# Patient Record
Sex: FEMALE | Race: WHITE | Employment: UNEMPLOYED | URBAN - NONMETROPOLITAN AREA
[De-identification: names, ages, dates, MRNs, and addresses within clinical notes are randomized per-mention and may not be internally consistent; named-entity substitution may affect disease eponyms.]

---

## 2020-12-16 ENCOUNTER — HOSPITAL ENCOUNTER (EMERGENCY)
Age: 40
Discharge: HOME | End: 2020-12-16
Attending: EMERGENCY MEDICINE
Payer: COMMERCIAL

## 2020-12-16 VITALS
OXYGEN SATURATION: 100 % | SYSTOLIC BLOOD PRESSURE: 124 MMHG | TEMPERATURE: 98.5 F | WEIGHT: 170 LBS | RESPIRATION RATE: 16 BRPM | DIASTOLIC BLOOD PRESSURE: 86 MMHG | HEART RATE: 73 BPM

## 2020-12-16 DIAGNOSIS — R10.9 ABDOMINAL CRAMPING: Primary | ICD-10-CM

## 2020-12-16 DIAGNOSIS — D25.9 UTERINE LEIOMYOMA, UNSPECIFIED LOCATION: ICD-10-CM

## 2020-12-16 LAB
APPEARANCE UR: CLEAR
BACTERIA URNS QL MICRO: NEGATIVE /HPF
BILIRUB UR QL: NEGATIVE
COLOR UR: YELLOW
GLUCOSE UR STRIP.AUTO-MCNC: NEGATIVE MG/DL
HCG UR QL: NEGATIVE
HGB UR QL STRIP: ABNORMAL
KETONES UR QL STRIP.AUTO: NEGATIVE MG/DL
LEUKOCYTE ESTERASE UR QL STRIP.AUTO: NEGATIVE
NITRITE UR QL STRIP.AUTO: NEGATIVE
PH UR STRIP: 6.5 [PH] (ref 5–8)
PROT UR STRIP-MCNC: NEGATIVE MG/DL
RBC #/AREA URNS HPF: ABNORMAL /HPF (ref 0–3)
SP GR UR REFRACTOMETRY: 1 (ref 1–1.03)
UROBILINOGEN UR QL STRIP.AUTO: 0.2 EU/DL (ref 0.2–1)
WBC URNS QL MICRO: ABNORMAL /HPF (ref 0–5)

## 2020-12-16 PROCEDURE — 81025 URINE PREGNANCY TEST: CPT

## 2020-12-16 PROCEDURE — 81003 URINALYSIS AUTO W/O SCOPE: CPT

## 2020-12-16 PROCEDURE — 99282 EMERGENCY DEPT VISIT SF MDM: CPT

## 2020-12-16 RX ORDER — NORETHINDRONE ACETATE AND ETHINYL ESTRADIOL, AND FERROUS FUMARATE 1.5-30(21)
KIT ORAL
COMMUNITY
Start: 2020-12-08

## 2020-12-16 RX ORDER — RANITIDINE 150 MG/1
150 TABLET, FILM COATED ORAL 2 TIMES DAILY
COMMUNITY

## 2020-12-16 NOTE — ED NOTES
Pt and family member given discharge instructions. No further questions at this time. Pain management discussed.

## 2020-12-16 NOTE — ED PROVIDER NOTES
EMERGENCY DEPARTMENT HISTORY AND PHYSICAL EXAM      Date: 12/16/2020  Patient Name: Bethanie Roberts    History of Presenting Illness     Chief Complaint   Patient presents with    Abdominal Pain    Hypertension       History Provided By: Patient and Patient's Son    HPI: Bethanie Roberts, 35 y.o. female with a past medical history significant Uterine fibroids presents to the ED with cc of intermittent abdominal cramping worse with menstruation ongoing now for multiple months. Previously evaluated  by OB/GYN for uterine fibroids and begun on hormonal therapy which is offered minimal relief. No fever occasional stool and lower abdominal cramping denies urinary symptoms. Pregnancy with normal vaginal delivery about a year and a half ago. Concerned with blood pressure though BP is upper limits of normal here in the ED no prior diagnosis of hypertension. The patient speaks virtually no English and translation is through her son who is fluent in both her native language and [place]. There are no other complaints, changes, or physical findings at this time. PCP: None    No current facility-administered medications on file prior to encounter. Current Outpatient Medications on File Prior to Encounter   Medication Sig Dispense Refill    Junel FE 1.5/30, 28, 1.5 mg-30 mcg (21)/75 mg (7) tab TAKE 1 TABLET BY MOUTH EVERY DAY      raNITIdine (ZANTAC) 150 mg tablet Take 150 mg by mouth two (2) times a day. Past History     Past Medical History:  History reviewed. No pertinent past medical history. Past Surgical History:  History reviewed. No pertinent surgical history. Family History:  History reviewed. No pertinent family history.     Social History:  Social History     Tobacco Use    Smoking status: Never Smoker    Smokeless tobacco: Never Used   Substance Use Topics    Alcohol use: Never     Frequency: Never    Drug use: Not on file       Allergies:  No Known Allergies      Review of Systems Review of other systems is negative  Review of Systems    Physical Exam   Pleasant female in no acute distress  Physical Exam  Vitals signs and nursing note reviewed. Constitutional:       General: She is not in acute distress. Appearance: Normal appearance. She is not ill-appearing, toxic-appearing or diaphoretic. HENT:      Head: Normocephalic and atraumatic. Right Ear: Tympanic membrane normal.      Left Ear: Tympanic membrane normal.      Nose: Nose normal.      Mouth/Throat:      Mouth: Mucous membranes are moist.   Eyes:      Conjunctiva/sclera: Conjunctivae normal.   Neck:      Musculoskeletal: Normal range of motion and neck supple. No neck rigidity or muscular tenderness. Cardiovascular:      Rate and Rhythm: Normal rate and regular rhythm. Pulses: Normal pulses. Heart sounds: Normal heart sounds. No murmur. Pulmonary:      Effort: Pulmonary effort is normal. No respiratory distress. Breath sounds: Normal breath sounds. No wheezing, rhonchi or rales. Chest:      Chest wall: No tenderness. Abdominal:      General: Abdomen is flat. Bowel sounds are normal.      Palpations: Abdomen is soft. There is no mass. Tenderness: There is no abdominal tenderness. There is no right CVA tenderness, left CVA tenderness, guarding or rebound. Hernia: No hernia is present. Comments: Minimal discomfort with deep pelvic palpation no true abdominal tenderness   Musculoskeletal: Normal range of motion. General: No tenderness, deformity or signs of injury. Right lower leg: No edema. Left lower leg: No edema. Skin:     General: Skin is warm. Findings: No erythema or rash. Neurological:      General: No focal deficit present. Mental Status: She is alert and oriented to person, place, and time. Cranial Nerves: No cranial nerve deficit. Sensory: No sensory deficit. Motor: No weakness.    Psychiatric:         Mood and Affect: Mood normal. Behavior: Behavior normal.         Thought Content: Thought content normal.         Lab and Diagnostic Study Results     Labs -     Recent Results (from the past 12 hour(s))   URINALYSIS W/ RFLX MICROSCOPIC    Collection Time: 12/16/20 10:30 AM   Result Value Ref Range    Color Yellow      Appearance Clear Clear      Specific gravity 1.005 1.003 - 1.030      pH (UA) 6.5 5.0 - 8.0      Protein Negative Negative mg/dL    Glucose Negative Negative mg/dL    Ketone Negative Negative mg/dL    Bilirubin Negative Negative      Blood Moderate (A) Negative      Urobilinogen 0.2 0.2 - 1.0 EU/dL    Nitrites Negative Negative      Leukocyte Esterase Negative Negative      WBC 0-4 0 - 5 /hpf    RBC 0-5 0 - 3 /hpf    Bacteria Negative Negative /hpf   HCG URINE, QL    Collection Time: 12/16/20 10:30 AM   Result Value Ref Range    HCG urine, QL Negative Negative         Radiologic Studies -   @lastxrresult@  CT Results  (Last 48 hours)    None        CXR Results  (Last 48 hours)    None            Medical Decision Making   - I am the first provider for this patient. - I reviewed the vital signs, available nursing notes, past medical history, past surgical history, family history and social history. - Initial assessment performed. The patients presenting problems have been discussed, and they are in agreement with the care plan formulated and outlined with them. I have encouraged them to ask questions as they arise throughout their visit. Vital Signs-Reviewed the patient's vital signs.   Patient Vitals for the past 12 hrs:   Temp Pulse Resp BP SpO2   12/16/20 0957 98.5 °F (36.9 °C) 88 20 (!) 144/88 100 %       Records Reviewed: Nursing Notes and Old Medical Records    The patient presents with abdominal cramping with a differential diagnosis of uterine fibroids  Menorrhagia diverticulitis appendicitis IBS      ED Course:          Provider Notes (Medical Decision Making):   48-year-old female with known uterine fibroids continues to have symptoms have not improved on current hormone therapy. Urinalysis and pregnancy test negative. Strongly encouraged OB/GYN follow-up to consider surgical treatment for uterine fibroids. Patient has an OB/GYN in [place] will give her local referral to Dr. Delmar Dow Making  Performed by: Nellie Godfrey MD  PROCEDURES: None  Procedures       Disposition   Disposition: Condition unchanged and stable  DC- Adult Discharges: All of the diagnostic tests were reviewed and questions answered. Diagnosis, care plan and treatment options were discussed. The patient understands the instructions and will follow up as directed. The patients results have been reviewed with them. They have been counseled regarding their diagnosis. The patient and family member patient and son   verbally convey understanding and agreement of the signs, symptoms, diagnosis, treatment and prognosis and additionally agrees to follow up as recommended with their PCP in 24 - 48 hours. They also agree with the care-plan and convey that all of their questions have been answered. I have also put together some discharge instructions for them that include: 1) educational information regarding their diagnosis, 2) how to care for their diagnosis at home, as well a 3) list of reasons why they would want to return to the ED prior to their follow-up appointment, should their condition change. DISCHARGE PLAN:  1. Current Discharge Medication List      CONTINUE these medications which have NOT CHANGED    Details   Junel FE 1.5/30, 28, 1.5 mg-30 mcg (21)/75 mg (7) tab TAKE 1 TABLET BY MOUTH EVERY DAY      raNITIdine (ZANTAC) 150 mg tablet Take 150 mg by mouth two (2) times a day. 2.   Follow-up Information    None       3. Return to ED if worse   4. Current Discharge Medication List            Diagnosis     Clinical Impression: 1.   Abdominal cramping 2.  Uterine fibroids  Attestations:    Shima Kat MD    Please note that this dictation was completed with [employer], the computer voice recognition software. Quite often unanticipated grammatical, syntax, homophones, and other interpretive errors are inadvertently transcribed by the computer software. Please disregard these errors. Please excuse any errors that have escaped final proofreading. Thank you.

## 2020-12-16 NOTE — ED TRIAGE NOTES
Pt reports having high blood pressure a few days ago and would like to have it checked today. Pt also reports back and abdominal pain that began a few days ago but got worse today. Pt denies vomiting and diarrhea but reports nausea. Pt reports pain at 7/10.

## 2021-04-16 ENCOUNTER — HOSPITAL ENCOUNTER (EMERGENCY)
Age: 41
Discharge: HOME | End: 2021-04-16
Attending: EMERGENCY MEDICINE
Payer: COMMERCIAL

## 2021-04-16 VITALS
WEIGHT: 150 LBS | SYSTOLIC BLOOD PRESSURE: 161 MMHG | RESPIRATION RATE: 18 BRPM | OXYGEN SATURATION: 99 % | TEMPERATURE: 98.4 F | HEART RATE: 102 BPM | BODY MASS INDEX: 29.45 KG/M2 | DIASTOLIC BLOOD PRESSURE: 93 MMHG | HEIGHT: 60 IN

## 2021-04-16 DIAGNOSIS — N39.0 URINARY TRACT INFECTION WITHOUT HEMATURIA, SITE UNSPECIFIED: Primary | ICD-10-CM

## 2021-04-16 LAB
ALBUMIN SERPL-MCNC: 3.7 G/DL (ref 3.5–5)
ALBUMIN/GLOB SERPL: 0.8 {RATIO} (ref 1.1–2.2)
ALP SERPL-CCNC: 75 U/L (ref 45–117)
ALT SERPL-CCNC: 18 U/L (ref 12–78)
ANION GAP SERPL CALC-SCNC: 7 MMOL/L (ref 5–15)
APPEARANCE UR: ABNORMAL
AST SERPL W P-5'-P-CCNC: 8 U/L (ref 15–37)
BACTERIA URNS QL MICRO: NEGATIVE /HPF
BASOPHILS # BLD: 0 K/UL (ref 0–0.1)
BASOPHILS NFR BLD: 0 % (ref 0–1)
BILIRUB SERPL-MCNC: 0.3 MG/DL (ref 0.2–1)
BILIRUB UR QL: NEGATIVE
BUN SERPL-MCNC: 6 MG/DL (ref 6–20)
BUN/CREAT SERPL: 12 (ref 12–20)
CA-I BLD-MCNC: 9 MG/DL (ref 8.5–10.1)
CHLORIDE SERPL-SCNC: 105 MMOL/L (ref 97–108)
CO2 SERPL-SCNC: 24 MMOL/L (ref 21–32)
COLOR UR: ABNORMAL
CREAT SERPL-MCNC: 0.5 MG/DL (ref 0.55–1.02)
DIFFERENTIAL METHOD BLD: ABNORMAL
EOSINOPHIL # BLD: 0 K/UL (ref 0–0.4)
EOSINOPHIL NFR BLD: 0 % (ref 0–7)
ERYTHROCYTE [DISTWIDTH] IN BLOOD BY AUTOMATED COUNT: 16.3 % (ref 11.5–14.5)
GLOBULIN SER CALC-MCNC: 4.4 G/DL (ref 2–4)
GLUCOSE SERPL-MCNC: 97 MG/DL (ref 65–100)
GLUCOSE UR STRIP.AUTO-MCNC: NEGATIVE MG/DL
HCG SERPL QL: NEGATIVE
HCT VFR BLD AUTO: 35.9 % (ref 35–47)
HGB BLD-MCNC: 11.2 G/DL (ref 11.5–16)
HGB UR QL STRIP: ABNORMAL
IMM GRANULOCYTES # BLD AUTO: 0 K/UL (ref 0–0.04)
IMM GRANULOCYTES NFR BLD AUTO: 0 % (ref 0–0.5)
KETONES UR QL STRIP.AUTO: NEGATIVE MG/DL
LEUKOCYTE ESTERASE UR QL STRIP.AUTO: ABNORMAL
LIPASE SERPL-CCNC: 145 U/L (ref 73–393)
LYMPHOCYTES # BLD: 2.3 K/UL (ref 0.8–3.5)
LYMPHOCYTES NFR BLD: 22 % (ref 12–49)
MCH RBC QN AUTO: 23.3 PG (ref 26–34)
MCHC RBC AUTO-ENTMCNC: 31.2 G/DL (ref 30–36.5)
MCV RBC AUTO: 74.8 FL (ref 80–99)
MONOCYTES # BLD: 0.7 K/UL (ref 0–1)
MONOCYTES NFR BLD: 7 % (ref 5–13)
MUCOUS THREADS URNS QL MICRO: ABNORMAL /LPF
NEUTS SEG # BLD: 7.1 K/UL (ref 1.8–8)
NEUTS SEG NFR BLD: 71 % (ref 32–75)
NITRITE UR QL STRIP.AUTO: NEGATIVE
PH UR STRIP: 6 [PH] (ref 5–8)
PLATELET # BLD AUTO: 305 K/UL (ref 150–400)
PMV BLD AUTO: 11.4 FL (ref 8.9–12.9)
POTASSIUM SERPL-SCNC: 3.3 MMOL/L (ref 3.5–5.1)
PROT SERPL-MCNC: 8.1 G/DL (ref 6.4–8.2)
PROT UR STRIP-MCNC: NEGATIVE MG/DL
RBC # BLD AUTO: 4.8 M/UL (ref 3.8–5.2)
RBC #/AREA URNS HPF: ABNORMAL /HPF (ref 0–5)
SODIUM SERPL-SCNC: 136 MMOL/L (ref 136–145)
SP GR UR REFRACTOMETRY: 1.01 (ref 1–1.03)
UA: UC IF INDICATED,UAUC: ABNORMAL
UROBILINOGEN UR QL STRIP.AUTO: 0.1 EU/DL (ref 0.1–1)
WBC # BLD AUTO: 10.1 K/UL (ref 3.6–11)
WBC URNS QL MICRO: ABNORMAL /HPF (ref 0–4)

## 2021-04-16 PROCEDURE — 99283 EMERGENCY DEPT VISIT LOW MDM: CPT

## 2021-04-16 PROCEDURE — 74011000250 HC RX REV CODE- 250: Performed by: EMERGENCY MEDICINE

## 2021-04-16 PROCEDURE — 36415 COLL VENOUS BLD VENIPUNCTURE: CPT

## 2021-04-16 PROCEDURE — 85025 COMPLETE CBC W/AUTO DIFF WBC: CPT

## 2021-04-16 PROCEDURE — 81001 URINALYSIS AUTO W/SCOPE: CPT

## 2021-04-16 PROCEDURE — 80053 COMPREHEN METABOLIC PANEL: CPT

## 2021-04-16 PROCEDURE — 74011250637 HC RX REV CODE- 250/637: Performed by: EMERGENCY MEDICINE

## 2021-04-16 PROCEDURE — 83690 ASSAY OF LIPASE: CPT

## 2021-04-16 PROCEDURE — 84703 CHORIONIC GONADOTROPIN ASSAY: CPT

## 2021-04-16 RX ORDER — CEPHALEXIN 500 MG/1
500 CAPSULE ORAL
Status: COMPLETED | OUTPATIENT
Start: 2021-04-16 | End: 2021-04-16

## 2021-04-16 RX ORDER — CEPHALEXIN 500 MG/1
500 CAPSULE ORAL 4 TIMES DAILY
Qty: 27 CAP | Refills: 0 | Status: SHIPPED | OUTPATIENT
Start: 2021-04-16 | End: 2021-04-23

## 2021-04-16 RX ADMIN — LIDOCAINE HYDROCHLORIDE 30 ML: 20 SOLUTION ORAL; TOPICAL at 21:29

## 2021-04-16 RX ADMIN — CEPHALEXIN 500 MG: 500 CAPSULE ORAL at 22:39

## 2021-04-16 NOTE — ED TRIAGE NOTES
Pt reports here for multiple complaints abd pain back pain and ear pain reports seen by pcp however s/s not improving

## 2021-04-17 NOTE — ED PROVIDER NOTES
HPI   Chief Complaint   Patient presents with    Epigastric Pain   54-year-old female with history of hypertension presents with epigastric pain. Patient reports chronic constant epigastric pain radiating to the left flank, no nausea vomiting or diarrhea or constipation. No dysuria. No fevers or chills. Patient reports taking Nexium with no relief. Past Medical History:   Diagnosis Date    Hypertension        No past surgical history on file. No family history on file. Social History     Socioeconomic History    Marital status:      Spouse name: Not on file    Number of children: Not on file    Years of education: Not on file    Highest education level: Not on file   Occupational History    Not on file   Social Needs    Financial resource strain: Not on file    Food insecurity     Worry: Not on file     Inability: Not on file    Transportation needs     Medical: Not on file     Non-medical: Not on file   Tobacco Use    Smoking status: Never Smoker    Smokeless tobacco: Never Used   Substance and Sexual Activity    Alcohol use: Never     Frequency: Never    Drug use: Not on file    Sexual activity: Not on file   Lifestyle    Physical activity     Days per week: Not on file     Minutes per session: Not on file    Stress: Not on file   Relationships    Social connections     Talks on phone: Not on file     Gets together: Not on file     Attends [employer] service: Not on file     Active member of club or organization: Not on file     Attends meetings of clubs or organizations: Not on file     Relationship status: Not on file    Intimate partner violence     Fear of current or ex partner: Not on file     Emotionally abused: Not on file     Physically abused: Not on file     Forced sexual activity: Not on file   Other Topics Concern    Not on file   Social History Narrative    Not on file         ALLERGIES: Patient has no known allergies.     Review of Systems   Gastrointestinal: Positive for abdominal pain. Genitourinary: Positive for flank pain. All other systems reviewed and are negative. Vitals:    04/16/21 1921   BP: (!) 161/93   Pulse: (!) 102   Resp: 18   Temp: 98.4 °F (36.9 °C)   SpO2: 99%   Weight: 68 kg (150 lb)   Height: 5' (1.524 m)            Physical Exam   Patient Vitals for the past 8 hrs:   Temp Pulse Resp BP SpO2   04/16/21 1921 98.4 °F (36.9 °C) (!) 102 18 (!) 161/93 99 %        Nursing note and vitals reviewed. Constitutional: NAD. Head: Normocephalic and atraumatic. Mouth/Throat: Airway patent. Moist mucous membranes. Eyes: EOMI. No scleral icterus. Neck: Neck supple. Cardiovascular: Well perfused throughout. Pulmonary/Chest: No respiratory distress. Abdominal/GI: BS normal, Soft, mildly tender epigastrium and left upper quadrant, non-distended. No rebound or guarding. Minimal left flank tenderness. Musculoskeletal: No gross injuries or deformities. Neurological: Alert and oriented to person, place, and time. Cranial Nerves 2-12 intact. Moving all extremities. No gross deficits. Psych: Pleasant, cooperative. Skin: Skin is warm and dry. No rash noted. MDM   Ddx = GERD, gastritis, urinary tract infection, IBS, low suspicion for bowel obstruction, intra-abdominal abscess, perforated viscus. Based on UA patient may have a urinary tract infection, prescribed 7-day Keflex. PCP referral is made. Instructed to follow-up PCP, return to ED if worsening.   Labs Reviewed   CBC WITH AUTOMATED DIFF - Abnormal; Notable for the following components:       Result Value    HGB 11.2 (*)     MCV 74.8 (*)     MCH 23.3 (*)     RDW 16.3 (*)     All other components within normal limits   METABOLIC PANEL, COMPREHENSIVE - Abnormal; Notable for the following components:    Potassium 3.3 (*)     Creatinine 0.50 (*)     AST (SGOT) 8 (*)     Globulin 4.4 (*)     A-G Ratio 0.8 (*)     All other components within normal limits   URINALYSIS W/ REFLEX CULTURE - Abnormal; Notable for the following components:    Appearance Turbid (*)     Blood Small (*)     Leukocyte Esterase Moderate (*)     All other components within normal limits   HCG QL SERUM   LIPASE     No orders to display     Medications   maalox/viscous lidocaine/diphenhydramine (GI COCKTAIL) (30 mL Oral Given 4/16/21 2129)   cephALEXin (KEFLEX) capsule 500 mg (500 mg Oral Given 4/16/21 2239)     IBishop MD, am  the first and primary ED provider for this patient.           Procedures

## 2022-05-30 ENCOUNTER — HOSPITAL ENCOUNTER (EMERGENCY)
Age: 42
Discharge: HOME | End: 2022-05-31
Attending: EMERGENCY MEDICINE
Payer: MEDICAID

## 2022-05-30 VITALS
HEART RATE: 106 BPM | SYSTOLIC BLOOD PRESSURE: 137 MMHG | TEMPERATURE: 99.1 F | OXYGEN SATURATION: 100 % | WEIGHT: 149.91 LBS | RESPIRATION RATE: 18 BRPM | BODY MASS INDEX: 29.43 KG/M2 | HEIGHT: 60 IN | DIASTOLIC BLOOD PRESSURE: 88 MMHG

## 2022-05-30 DIAGNOSIS — R42 DIZZINESS: ICD-10-CM

## 2022-05-30 DIAGNOSIS — M26.629 TMJ SYNDROME: ICD-10-CM

## 2022-05-30 DIAGNOSIS — D21.9 FIBROID TUMOR: Primary | ICD-10-CM

## 2022-05-30 LAB
ALBUMIN SERPL-MCNC: 3.6 G/DL (ref 3.5–5)
ALBUMIN/GLOB SERPL: 0.8 {RATIO} (ref 1.1–2.2)
ALP SERPL-CCNC: 78 U/L (ref 45–117)
ALT SERPL-CCNC: 20 U/L (ref 12–78)
ANION GAP SERPL CALC-SCNC: 7 MMOL/L (ref 5–15)
AST SERPL W P-5'-P-CCNC: 11 U/L (ref 15–37)
BASOPHILS # BLD: 0.1 K/UL (ref 0–0.2)
BASOPHILS NFR BLD: 1 % (ref 0–2.5)
BILIRUB SERPL-MCNC: 0.3 MG/DL (ref 0.2–1)
BUN SERPL-MCNC: 6 MG/DL (ref 6–20)
BUN/CREAT SERPL: 10 (ref 12–20)
CA-I BLD-MCNC: 9 MG/DL (ref 8.5–10.1)
CHLORIDE SERPL-SCNC: 107 MMOL/L (ref 97–108)
CO2 SERPL-SCNC: 28 MMOL/L (ref 21–32)
CREAT SERPL-MCNC: 0.63 MG/DL (ref 0.55–1.02)
EOSINOPHIL # BLD: 0 K/UL (ref 0–0.7)
EOSINOPHIL NFR BLD: 0 % (ref 0.9–2.9)
ERYTHROCYTE [DISTWIDTH] IN BLOOD BY AUTOMATED COUNT: 14.1 % (ref 11.5–14.5)
GLOBULIN SER CALC-MCNC: 4.4 G/DL (ref 2–4)
GLUCOSE SERPL-MCNC: 117 MG/DL (ref 65–100)
HCT VFR BLD AUTO: 40.9 % (ref 36–46)
HGB BLD-MCNC: 13.5 G/DL (ref 13.5–17.5)
LYMPHOCYTES # BLD: 1.7 K/UL (ref 1–4.8)
LYMPHOCYTES NFR BLD: 17 % (ref 20.5–51.1)
MCH RBC QN AUTO: 28 PG (ref 31–34)
MCHC RBC AUTO-ENTMCNC: 33 G/DL (ref 31–36)
MCV RBC AUTO: 85.1 FL (ref 80–100)
MONOCYTES # BLD: 0.4 K/UL (ref 0.2–2.4)
MONOCYTES NFR BLD: 4 % (ref 1.7–9.3)
NEUTS SEG # BLD: 7.8 K/UL (ref 1.8–7.7)
NEUTS SEG NFR BLD: 78 % (ref 42–75)
NRBC # BLD: 0.01 K/UL
NRBC BLD-RTO: 0.1 PER 100 WBC
PLATELET # BLD AUTO: 228 K/UL (ref 150–400)
PMV BLD AUTO: 9.4 FL (ref 6.5–11.5)
POTASSIUM SERPL-SCNC: 3.9 MMOL/L (ref 3.5–5.1)
PROT SERPL-MCNC: 8 G/DL (ref 6.4–8.2)
RBC # BLD AUTO: 4.81 M/UL (ref 4.5–5.9)
SODIUM SERPL-SCNC: 142 MMOL/L (ref 136–145)
WBC # BLD AUTO: 9.9 K/UL (ref 4.4–11.3)

## 2022-05-30 PROCEDURE — 36415 COLL VENOUS BLD VENIPUNCTURE: CPT

## 2022-05-30 PROCEDURE — 80053 COMPREHEN METABOLIC PANEL: CPT

## 2022-05-30 PROCEDURE — 81025 URINE PREGNANCY TEST: CPT

## 2022-05-30 PROCEDURE — 85025 COMPLETE CBC W/AUTO DIFF WBC: CPT

## 2022-05-30 PROCEDURE — 99284 EMERGENCY DEPT VISIT MOD MDM: CPT

## 2022-05-30 PROCEDURE — 81001 URINALYSIS AUTO W/SCOPE: CPT

## 2022-05-30 NOTE — ED TRIAGE NOTES
Pt reports lower abdominal pain, burning with urination, left flank pain, headache and elevated blood pressure starting this morning.

## 2022-05-31 ENCOUNTER — APPOINTMENT (OUTPATIENT)
Dept: CT IMAGING | Age: 42
End: 2022-05-31
Attending: EMERGENCY MEDICINE
Payer: MEDICAID

## 2022-05-31 LAB
APPEARANCE UR: CLEAR
BACTERIA URNS QL MICRO: NEGATIVE /HPF
BILIRUB UR QL: NEGATIVE
COLOR UR: ABNORMAL
GLUCOSE UR STRIP.AUTO-MCNC: NEGATIVE MG/DL
HCG UR QL: NEGATIVE
HGB UR QL STRIP: ABNORMAL
KETONES UR QL STRIP.AUTO: NEGATIVE MG/DL
LEUKOCYTE ESTERASE UR QL STRIP.AUTO: NEGATIVE
NITRITE UR QL STRIP.AUTO: NEGATIVE
PH UR STRIP: 7.5 [PH] (ref 5–8)
PROT UR STRIP-MCNC: NEGATIVE MG/DL
RBC #/AREA URNS HPF: NORMAL /HPF (ref 0–3)
SP GR UR REFRACTOMETRY: 1.01 (ref 1–1.03)
UROBILINOGEN UR QL STRIP.AUTO: 0.2 EU/DL (ref 0.2–1)
WBC URNS QL MICRO: NORMAL /HPF (ref 0–5)

## 2022-05-31 PROCEDURE — 74176 CT ABD & PELVIS W/O CONTRAST: CPT

## 2022-05-31 RX ORDER — DICLOFENAC SODIUM 75 MG/1
75 TABLET, DELAYED RELEASE ORAL 2 TIMES DAILY
Qty: 14 TABLET | Refills: 0 | Status: SHIPPED | OUTPATIENT
Start: 2022-05-31 | End: 2022-06-07

## 2022-05-31 NOTE — ED PROVIDER NOTES
EMERGENCY DEPARTMENT HISTORY AND PHYSICAL EXAM  ?    Date: 5/30/2022  Patient Name: Neva Blood    History of Presenting Illness    Patient presents with:  Abdominal Pain      History Provided By: Patient via [employer]     HPI: Neva Blood, 42 y.o. female with a past medical history significant for hypertension presents to the ED with cc of dizziness and lower abdominal pain, both of which have been going on for a long time. She denies vomiting. She reports dysuria and increased frequency of urination. She has history of uterine fibroid tumor. Dizziness sometimes vertiginous and she also feels near syncopal.    She also complains of bilateral temporomandibular pain. She saw a dentist and was diagnosed with TMJ and referred to oral surgery. There are no other complaints, changes, or physical findings at this time. PCP: None    Current Outpatient Medications:  diclofenac EC (VOLTAREN) 75 mg EC tablet, Take 1 Tablet by mouth two (2) times a day for 7 days. , Disp: 14 Tablet, Rfl: 0  Junel FE 1.5/30, 28, 1.5 mg-30 mcg (21)/75 mg (7) tab, TAKE 1 TABLET BY MOUTH EVERY DAY, Disp: , Rfl:   raNITIdine (ZANTAC) 150 mg tablet, Take 150 mg by mouth two (2) times a day., Disp: , Rfl:         Past History    Past Medical History:  Past Medical History:  No date: Hypertension    Past Surgical History:  No past surgical history on file. Family History:  History reviewed. No pertinent family history.       Social History:  Social History   Tobacco Use     Smoking status: Never Smoker     Smokeless tobacco: Never Used   Alcohol use: Never   Drug use: Not on file      Allergies:  No Known Allergies      Review of Systems  @[facility]@    Physical Exam  @JEB@    Diagnostic Study Results    Labs -  Recent Results (from the past 12 hour(s))  -CBC WITH AUTOMATED DIFF:   Collection Time: 05/30/22 10:00 PM      Result                      Value             Ref Range          WBC                         9.9 4.4 - 11.3 K*      RBC                         4.81              4.50 - 5.90 *      HGB                         13.5              13.5 - 17.5 *      HCT                         40.9              36 - 46 %          MCV                         85.1              80 - 100 FL        MCH                         28.0 (L)          31 - 34 PG         MCHC                        33.0              31.0 - 36.0 *      RDW                         14.1              11.5 - 14.5 %      PLATELET                    228               150 - 400 K/*      MPV                         9.4               6.5 - 11.5 FL      NRBC                        0.1                WBC        ABSOLUTE NRBC               0.01              K/uL               NEUTROPHILS                 78 (H)            42 - 75 %          LYMPHOCYTES                 17 (L)            20.5 - 51.1 %      MONOCYTES                   4                 1.7 - 9.3 %        EOSINOPHILS                 0 (L)             0.9 - 2.9 %        BASOPHILS                   1                 0.0 - 2.5 %        ABS. NEUTROPHILS            7.8 (H)           1.8 - 7.7 K/*      ABS. LYMPHOCYTES            1.7               1.0 - 4.8 K/*      ABS. MONOCYTES              0.4               0.2 - 2.4 K/*      ABS. EOSINOPHILS            0.0               0.0 - 0.7 K/*      ABS.  BASOPHILS              0.1               0.0 - 0.2 K/*  -METABOLIC PANEL, COMPREHENSIVE:   Collection Time: 05/30/22 10:00 PM      Result                      Value             Ref Range          Sodium                      142               136 - 145 mm*      Potassium                   3.9               3.5 - 5.1 mm*      Chloride                    107               97 - 108 mmo*      CO2                         28                21 - 32 mmol*      Anion gap                   7                 5 - 15 mmol/L      Glucose                     117 (H)           65 - 100 mg/*      BUN 6                 6 - 20 mg/dL       Creatinine                  0.63              0.55 - 1.02 *      BUN/Creatinine ratio        10 (L)            12 - 20            GFR est AA                  >60               >60 ml/min/1*      GFR est non-AA              >60               >60 ml/min/1*      Calcium                     9.0               8.5 - 10.1 m*      Bilirubin, total            0.3               0.2 - 1.0 mg*      AST (SGOT)                  11 (L)            15 - 37 U/L        ALT (SGPT)                  20                12 - 78 U/L        Alk. phosphatase            78                45 - 117 U/L       Protein, total              8.0               6.4 - 8.2 g/*      Albumin                     3.6               3.5 - 5.0 g/*      Globulin                    4.4 (H)           2.0 - 4.0 g/*      A-G Ratio                   0.8 (L)           1.1 - 2.2        Radiologic Studies -   CT ABD PELV WO CONT  Final Result   1. Nonobstructive renal calculi. 2. Stable right adnexal mass presumably fibroid. 3. No evidence of an acute process. CT Results  (Last 48 hours)             05/31/22 0033  CT ABD PELV WO CONT Final result   Impression:  1. Nonobstructive renal calculi. 2. Stable right adnexal mass presumably fibroid. 3. No evidence of an acute process. Narrative:  CT ABD PELV WO CONT      Technique: Noncontrasted CT scan of the abdomen and pelvis was performed   utilizing transaxial images obtained from the dome of the diaphragm to the   pubic   symphysis. Coronal and sagittal reconstructions were generated. Dose Reduction:    All CT scans at this facility are performed using dose reduction   optimization   techniques as appropriate to a performed exam including the following:   Automated   exposure control, adjustments of the mA and/or kV according to patient   size, or   use of iterative reconstruction technique. Comparison:  12/10/2019. Findings: The lung bases are clear.  The liver, spleen, pancreas, and   adrenal   glands are unremarkable for noncontrasted technique. A few small bilateral     nonobstructive renal calculi are present. Negative for hydronephrosis or   ureteral calculus. The gallbladder is unremarkable. No biliary duct   dilatation   apparent. The abdominal aorta is normal in caliber. Bowel loops are unremarkable. The appendix is normal. No free fluid   evident. The   urinary bladder is unremarkable. Stable 9.4 cm x 7.4 cm right adnexal mass     inseparable from the uterus likely fibroid. No suspicious lytic or blastic skeletal lesion apparent. CXR Results  (Last 48 hours)   None       Medical Decision Making and ED Course  I am the first provider for this patient. I reviewed the vital signs, available nursing notes, past medical history, past surgical history, family history and social history. Vital Signs-Reviewed the patient's vital signs. Empty flowsheet group. Records Reviewed: Nursing Notes    Provider Notes (Medical Decision Making):   Rule out UTI. Check CT scan of the abdomen    The patient presents with differential diagnosis of UTI, appendicitis, tubo-ovarian abscess, diverticulitis, enteritis. ED Course:   WBC is normal.  CT scan shows large fibroid tumor. Initial assessment performed. The patients presenting problems have been discussed, and they are in agreement with the care plan formulated and outlined with them. I have encouraged them to ask questions as they arise throughout their visit. Disposition      Discharged      DISCHARGE PLAN:  1. Cannot display discharge medications since this patient is not currently admitted. 2. Follow-up Information    None    3.   Return to ED if worse     Diagnosis    Clinical Impression: Fibroid tumor  (primary encounter diagnosis)  Dizziness  TMJ syndrome    Attestations:    Qing Martinez MD    Please note that this dictation was completed with [employer], the computer voice recognition software. Quite often unanticipated grammatical, syntax, homophones, and other interpretive errors are inadvertently transcribed by the computer software. Please disregard these errors. Please excuse any errors that have escaped final proofreading. Thank you.    ? Past Medical History:   Diagnosis Date    Hypertension        No past surgical history on file. History reviewed. No pertinent family history. Social History     Socioeconomic History    Marital status:      Spouse name: Not on file    Number of children: Not on file    Years of education: Not on file    Highest education level: Not on file   Occupational History    Not on file   Tobacco Use    Smoking status: Never Smoker    Smokeless tobacco: Never Used   Substance and Sexual Activity    Alcohol use: Never    Drug use: Not on file    Sexual activity: Not on file   Other Topics Concern    Not on file   Social History Narrative    Not on file     Social Determinants of Health     Financial Resource Strain:     Difficulty of Paying Living Expenses: Not on file   Food Insecurity:     Worried About Running Out of Food in the Last Year: Not on file    Ester of Food in the Last Year: Not on file   Transportation Needs:     Lack of Transportation (Medical): Not on file    Lack of Transportation (Non-Medical):  Not on file   Physical Activity:     Days of Exercise per Week: Not on file    Minutes of Exercise per Session: Not on file   Stress:     Feeling of Stress : Not on file   Social Connections:     Frequency of Communication with Friends and Family: Not on file    Frequency of Social Gatherings with Friends and Family: Not on file    Attends Baptism Services: Not on file    Active Member of Clubs or Organizations: Not on file    Attends Club or Organization Meetings: Not on file    Marital Status: Not on file   Intimate Partner Violence:     Fear of Current or Ex-Partner: Not on file    Emotionally Abused: Not on file    Physically Abused: Not on file    Sexually Abused: Not on file   Housing Stability:     Unable to Pay for Housing in the Last Year: Not on file    Number of Places Lived in the Last Year: Not on file    Unstable Housing in the Last Year: Not on file         ALLERGIES: Patient has no known allergies. Review of Systems   Constitutional: Negative. HENT: Positive for ear pain. Eyes: Negative. Respiratory: Negative. Cardiovascular: Negative. Gastrointestinal: Positive for abdominal pain. Negative for constipation, diarrhea, nausea and vomiting. Endocrine: Negative. Genitourinary: Negative. Musculoskeletal: Negative. Neurological: Positive for dizziness. Hematological: Negative. Psychiatric/Behavioral: Negative. Vitals:    05/30/22 1847 05/30/22 2128   BP: (!) 167/95 137/88   Pulse: (!) 117 (!) 106   Resp: 20 18   Temp: 99.1 °F (37.3 °C)    SpO2: 100% 100%   Weight: 68 kg (149 lb 14.6 oz)    Height: 5' (1.524 m)             Physical Exam  Vitals and nursing note reviewed. Constitutional:       Appearance: Normal appearance. HENT:      Head: Normocephalic and atraumatic. Jaw: Tenderness present. Comments: Tender bilateral TMJ     Right Ear: Tympanic membrane and ear canal normal.      Left Ear: Tympanic membrane and ear canal normal.      Nose: Nose normal.      Mouth/Throat:      Mouth: Mucous membranes are moist.      Pharynx: Oropharynx is clear. Eyes:      Extraocular Movements: Extraocular movements intact. Conjunctiva/sclera: Conjunctivae normal.      Pupils: Pupils are equal, round, and reactive to light. Cardiovascular:      Rate and Rhythm: Normal rate and regular rhythm. Pulses: Normal pulses. Heart sounds: Normal heart sounds. Pulmonary:      Effort: Pulmonary effort is normal.      Breath sounds: Normal breath sounds. Abdominal:      General: Abdomen is flat.  Bowel sounds are normal. Palpations: Abdomen is soft. Tenderness: There is abdominal tenderness in the suprapubic area. Musculoskeletal:         General: Normal range of motion. Cervical back: Normal range of motion and neck supple. Skin:     General: Skin is warm and dry. Neurological:      General: No focal deficit present. Mental Status: She is alert and oriented to person, place, and time.    Psychiatric:         Mood and Affect: Mood normal.         Behavior: Behavior normal.          MDM  Number of Diagnoses or Management Options     Amount and/or Complexity of Data Reviewed  Clinical lab tests: reviewed  Tests in the radiology section of CPT®: reviewed    Risk of Complications, Morbidity, and/or Mortality  Presenting problems: moderate  Diagnostic procedures: high  Management options: moderate    Patient Progress  Patient progress: stable         Procedures

## 2023-03-29 ENCOUNTER — HOSPITAL ENCOUNTER (EMERGENCY)
Age: 43
Discharge: HOME | End: 2023-03-30
Attending: EMERGENCY MEDICINE
Payer: MEDICAID

## 2023-03-29 DIAGNOSIS — J45.21 MILD INTERMITTENT REACTIVE AIRWAY DISEASE WITH ACUTE EXACERBATION: Primary | ICD-10-CM

## 2023-03-29 DIAGNOSIS — K21.9 GASTROESOPHAGEAL REFLUX DISEASE WITHOUT ESOPHAGITIS: ICD-10-CM

## 2023-03-29 DIAGNOSIS — D12.6 ADENOMATOSIS: ICD-10-CM

## 2023-03-29 LAB
ALBUMIN SERPL-MCNC: 3.5 G/DL (ref 3.5–5)
ALBUMIN/GLOB SERPL: 0.9 (ref 1.1–2.2)
ALP SERPL-CCNC: 76 U/L (ref 45–117)
ALT SERPL-CCNC: 21 U/L (ref 12–78)
ANION GAP SERPL CALC-SCNC: 8 MMOL/L (ref 5–15)
APPEARANCE UR: CLEAR
AST SERPL W P-5'-P-CCNC: 14 U/L (ref 15–37)
BACTERIA URNS QL MICRO: NEGATIVE /HPF
BASOPHILS # BLD: 0 K/UL (ref 0–0.1)
BASOPHILS NFR BLD: 0 % (ref 0–1)
BILIRUB SERPL-MCNC: 0.3 MG/DL (ref 0.2–1)
BILIRUB UR QL: NEGATIVE
BUN SERPL-MCNC: 8 MG/DL (ref 6–20)
BUN/CREAT SERPL: 13 (ref 12–20)
CA-I BLD-MCNC: 9.1 MG/DL (ref 8.5–10.1)
CHLORIDE SERPL-SCNC: 105 MMOL/L (ref 97–108)
CO2 SERPL-SCNC: 28 MMOL/L (ref 21–32)
COLOR UR: NORMAL
CREAT SERPL-MCNC: 0.62 MG/DL (ref 0.55–1.02)
DIFFERENTIAL METHOD BLD: NORMAL
EOSINOPHIL # BLD: 0 K/UL (ref 0–0.4)
EOSINOPHIL NFR BLD: 0 % (ref 0–7)
ERYTHROCYTE [DISTWIDTH] IN BLOOD BY AUTOMATED COUNT: 12.7 % (ref 11.5–14.5)
GLOBULIN SER CALC-MCNC: 4 G/DL (ref 2–4)
GLUCOSE SERPL-MCNC: 167 MG/DL (ref 65–100)
GLUCOSE UR STRIP.AUTO-MCNC: NEGATIVE MG/DL
HCG UR QL: NEGATIVE
HCT VFR BLD AUTO: 40.9 % (ref 35–47)
HGB BLD-MCNC: 13.1 G/DL (ref 11.5–16)
HGB UR QL STRIP: NEGATIVE
IMM GRANULOCYTES # BLD AUTO: 0 K/UL (ref 0–0.04)
IMM GRANULOCYTES NFR BLD AUTO: 0 % (ref 0–0.5)
KETONES UR QL STRIP.AUTO: NEGATIVE MG/DL
LEUKOCYTE ESTERASE UR QL STRIP.AUTO: NEGATIVE
LYMPHOCYTES # BLD: 2.5 K/UL (ref 0.8–3.5)
LYMPHOCYTES NFR BLD: 25 % (ref 12–49)
MCH RBC QN AUTO: 27.9 PG (ref 26–34)
MCHC RBC AUTO-ENTMCNC: 32 G/DL (ref 30–36.5)
MCV RBC AUTO: 87.2 FL (ref 80–99)
MONOCYTES # BLD: 0.6 K/UL (ref 0–1)
MONOCYTES NFR BLD: 6 % (ref 5–13)
NEUTS SEG # BLD: 6.8 K/UL (ref 1.8–8)
NEUTS SEG NFR BLD: 69 % (ref 32–75)
NITRITE UR QL STRIP.AUTO: NEGATIVE
NRBC # BLD: 0 K/UL (ref 0–0.01)
NRBC BLD-RTO: 0 PER 100 WBC
PH UR STRIP: 5.5 (ref 5–8)
PLATELET # BLD AUTO: 228 K/UL (ref 150–400)
PMV BLD AUTO: 11 FL (ref 8.9–12.9)
POTASSIUM SERPL-SCNC: 3.4 MMOL/L (ref 3.5–5.1)
PROT SERPL-MCNC: 7.5 G/DL (ref 6.4–8.2)
PROT UR STRIP-MCNC: NEGATIVE MG/DL
RBC # BLD AUTO: 4.69 M/UL (ref 3.8–5.2)
RBC #/AREA URNS HPF: NORMAL /HPF (ref 0–3)
SODIUM SERPL-SCNC: 141 MMOL/L (ref 136–145)
SP GR UR REFRACTOMETRY: <1.005 (ref 1–1.03)
TROPONIN I SERPL HS-MCNC: <4 NG/L (ref 0–51)
UA: UC IF INDICATED,UAUC: NORMAL
UROBILINOGEN UR QL STRIP.AUTO: 0.2 EU/DL (ref 0.2–1)
WBC # BLD AUTO: 9.9 K/UL (ref 3.6–11)
WBC URNS QL MICRO: NORMAL /HPF (ref 0–5)

## 2023-03-29 PROCEDURE — 74011000250 HC RX REV CODE- 250: Performed by: EMERGENCY MEDICINE

## 2023-03-29 PROCEDURE — 94640 AIRWAY INHALATION TREATMENT: CPT

## 2023-03-29 PROCEDURE — 81025 URINE PREGNANCY TEST: CPT

## 2023-03-29 PROCEDURE — 93005 ELECTROCARDIOGRAM TRACING: CPT

## 2023-03-29 PROCEDURE — 96374 THER/PROPH/DIAG INJ IV PUSH: CPT

## 2023-03-29 PROCEDURE — 81001 URINALYSIS AUTO W/SCOPE: CPT

## 2023-03-29 PROCEDURE — 85025 COMPLETE CBC W/AUTO DIFF WBC: CPT

## 2023-03-29 PROCEDURE — 80053 COMPREHEN METABOLIC PANEL: CPT

## 2023-03-29 PROCEDURE — 74011250636 HC RX REV CODE- 250/636: Performed by: EMERGENCY MEDICINE

## 2023-03-29 PROCEDURE — 84484 ASSAY OF TROPONIN QUANT: CPT

## 2023-03-29 PROCEDURE — 36415 COLL VENOUS BLD VENIPUNCTURE: CPT

## 2023-03-29 PROCEDURE — 99284 EMERGENCY DEPT VISIT MOD MDM: CPT

## 2023-03-29 RX ORDER — ESOMEPRAZOLE MAGNESIUM 40 MG/1
40 CAPSULE, DELAYED RELEASE ORAL DAILY
COMMUNITY
Start: 2023-03-11 | End: 2023-03-30

## 2023-03-29 RX ORDER — IPRATROPIUM BROMIDE AND ALBUTEROL SULFATE 2.5; .5 MG/3ML; MG/3ML
3 SOLUTION RESPIRATORY (INHALATION)
Status: COMPLETED | OUTPATIENT
Start: 2023-03-29 | End: 2023-03-29

## 2023-03-29 RX ADMIN — METHYLPREDNISOLONE SODIUM SUCCINATE 125 MG: 125 INJECTION, POWDER, FOR SOLUTION INTRAMUSCULAR; INTRAVENOUS at 23:09

## 2023-03-29 RX ADMIN — IPRATROPIUM BROMIDE AND ALBUTEROL SULFATE 3 ML: .5; 3 SOLUTION RESPIRATORY (INHALATION) at 23:15

## 2023-03-30 ENCOUNTER — APPOINTMENT (OUTPATIENT)
Dept: CT IMAGING | Age: 43
End: 2023-03-30
Attending: EMERGENCY MEDICINE
Payer: MEDICAID

## 2023-03-30 VITALS
BODY MASS INDEX: 33.34 KG/M2 | HEART RATE: 110 BPM | TEMPERATURE: 98.7 F | WEIGHT: 195.3 LBS | OXYGEN SATURATION: 99 % | HEIGHT: 64 IN | DIASTOLIC BLOOD PRESSURE: 82 MMHG | SYSTOLIC BLOOD PRESSURE: 137 MMHG | RESPIRATION RATE: 20 BRPM

## 2023-03-30 LAB
ATRIAL RATE: 114 BPM
CALCULATED P AXIS, ECG09: 69 DEGREES
CALCULATED R AXIS, ECG10: 69 DEGREES
CALCULATED T AXIS, ECG11: -71 DEGREES
DIAGNOSIS, 93000: NORMAL
P-R INTERVAL, ECG05: 186 MS
Q-T INTERVAL, ECG07: 307 MS
QRS DURATION, ECG06: 89 MS
QTC CALCULATION (BEZET), ECG08: 423 MS
VENTRICULAR RATE, ECG03: 114 BPM

## 2023-03-30 PROCEDURE — 74176 CT ABD & PELVIS W/O CONTRAST: CPT

## 2023-03-30 RX ORDER — ALBUTEROL SULFATE 90 UG/1
2 AEROSOL, METERED RESPIRATORY (INHALATION)
Qty: 18 G | Refills: 0 | Status: SHIPPED | OUTPATIENT
Start: 2023-03-30 | End: 2023-04-29

## 2023-03-30 RX ORDER — HYDROCODONE BITARTRATE AND ACETAMINOPHEN 5; 325 MG/1; MG/1
1 TABLET ORAL
Qty: 18 TABLET | Refills: 0 | Status: SHIPPED | OUTPATIENT
Start: 2023-03-30 | End: 2023-04-02

## 2023-03-30 RX ORDER — PANTOPRAZOLE SODIUM 40 MG/1
40 TABLET, DELAYED RELEASE ORAL DAILY
Qty: 14 TABLET | Refills: 0 | Status: SHIPPED | OUTPATIENT
Start: 2023-03-30 | End: 2023-04-13

## 2023-03-30 RX ORDER — PREDNISONE 20 MG/1
20 TABLET ORAL 2 TIMES DAILY
Qty: 10 TABLET | Refills: 0 | Status: SHIPPED | OUTPATIENT
Start: 2023-03-30 | End: 2023-04-04

## 2023-03-30 NOTE — ED TRIAGE NOTES
Pt hasn't been feeling well over the last few days, having diarrhea yesterday, but within the last two hours she has been having chest pain along with dizziness and nausea without vomiting.

## 2023-03-30 NOTE — ED NOTES
Pt educated on the use of an inhaler and discharge instructions via interpretation services, jordana Ko #[id number]. Pt verbalized understanding and ambulated from ED with all belongings.

## 2023-03-30 NOTE — ED PROVIDER NOTES
EMERGENCY DEPARTMENT HISTORY AND PHYSICAL EXAM  ?    Date: 3/29/2023  Patient Name: Rea Stevens    History of Presenting Illness    Patient presents with:  Chest Pain  Nausea  Dizziness      History Provided By: Patient via [employer] . HPI: Rea Stevens, 36 y.o. female with a past medical history significant for hypertension presents to the ED with cc of chest tightness, coughing, shortness of breath and wheezing that has been intermittent for 1 week month. In addition she complains of suprapubic abdominal pain and dysuria. Symptoms are moderate to severe. There are no other complaints, changes, or physical findings at this time. PCP: Geoffry Brunner, NP    Current Outpatient Medications:  albuterol (PROVENTIL HFA, VENTOLIN HFA, PROAIR HFA) 90 mcg/actuation inhaler, Take 2 Puffs by inhalation every four (4) hours as needed for Wheezing, Shortness of Breath or Cough for up to 30 days. , Disp: 18 g, Rfl: 0  predniSONE (DELTASONE) 20 mg tablet, Take 1 Tablet by mouth two (2) times a day for 5 days. With Breakfast, Disp: 10 Tablet, Rfl: 0  HYDROcodone-acetaminophen (Norco) 5-325 mg per tablet, Take 1 Tablet by mouth every four (4) hours as needed for Pain for up to 3 days. Max Daily Amount: 6 Tablets. , Disp: 18 Tablet, Rfl: 0  esomeprazole (NEXIUM) 40 mg capsule, Take 40 mg by mouth daily. , Disp: , Rfl:         Past History    Past Medical History:  Past Medical History:  No date: GERD (gastroesophageal reflux disease)  No date: Hypertension    Past Surgical History:  History reviewed. No pertinent surgical history. Family History:  History reviewed. No pertinent family history.       Social History:  Social History    Tobacco Use      Smoking status: Never      Smokeless tobacco: Never    Vaping Use      Vaping Use: Never used    Alcohol use: Never    Drug use: Never      Allergies:  No Known Allergies      Review of Systems  [unfilled]    Physical Exam  [unfilled]    Diagnostic Study Results    Labs -   Recent Results (from the past 12 hour(s))  -EKG, 12 LEAD, INITIAL:   Collection Time: 03/29/23  9:39 PM       Result                      Value             Ref Range           Ventricular Rate            114               BPM                 Atrial Rate                 114               BPM                 P-R Interval                186               ms                  QRS Duration                89                ms                  Q-T Interval                307               ms                  QTC Calculation (Bezet)     423               ms                  Calculated P Axis           69                degrees             Calculated R Axis           69                degrees             Calculated T Axis           -71               degrees             Diagnosis                                                     Sinus tachycardia Biatrial enlargement Borderline repolarization abnormality   -CBC WITH AUTOMATED DIFF:   Collection Time: 03/29/23 10:00 PM       Result                      Value             Ref Range           WBC                         9.9               3.6 - 11.0 K*       RBC                         4.69              3.80 - 5.20 *       HGB                         13.1              11.5 - 16.0 *       HCT                         40.9              35.0 - 47.0 %       MCV                         87.2              80.0 - 99.0 *       MCH                         27.9              26.0 - 34.0 *       MCHC                        32.0              30.0 - 36.5 *       RDW                         12.7              11.5 - 14.5 %       PLATELET                    228               150 - 400 K/*       MPV                         11.0              8.9 - 12.9 FL       NRBC                        0.0               0.0  *       ABSOLUTE NRBC               0.00              0.00 - 0.01 *       NEUTROPHILS                 69                32 - 75 %           LYMPHOCYTES                 25                12 - 49 %           MONOCYTES                   6                 5 - 13 %            EOSINOPHILS                 0                 0 - 7 %             BASOPHILS                   0                 0 - 1 %             IMMATURE GRANULOCYTES       0                 0 - 0.5 %           ABS. NEUTROPHILS            6.8               1.8 - 8.0 K/*       ABS. LYMPHOCYTES            2.5               0.8 - 3.5 K/*       ABS. MONOCYTES              0.6               0.0 - 1.0 K/*       ABS. EOSINOPHILS            0.0               0.0 - 0.4 K/*       ABS. BASOPHILS              0.0               0.0 - 0.1 K/*       ABS. IMM. GRANS.            0.0               0.00 - 0.04 *       DF                          AUTOMATED                        -METABOLIC PANEL, COMPREHENSIVE:   Collection Time: 03/29/23 10:00 PM       Result                      Value             Ref Range           Sodium                      141               136 - 145 mm*       Potassium                   3.4 (L)           3.5 - 5.1 mm*       Chloride                    105               97 - 108 mmo*       CO2                         28                21 - 32 mmol*       Anion gap                   8                 5 - 15 mmol/L       Glucose                     167 (H)           65 - 100 mg/*       BUN                         8                 6 - 20 mg/dL        Creatinine                  0.62              0.55 - 1.02 *       BUN/Creatinine ratio        13                12 - 20             eGFR                        >60               >60 ml/min/1*       Calcium                     9.1               8.5 - 10.1 m*       Bilirubin, total            0.3               0.2 - 1.0 mg*       AST (SGOT)                  14 (L)            15 - 37 U/L         ALT (SGPT)                  21                12 - 78 U/L         Alk.  phosphatase            76                45 - 117 U/L        Protein, total 7.5               6.4 - 8.2 g/*       Albumin                     3.5               3.5 - 5.0 g/*       Globulin                    4.0               2.0 - 4.0 g/*       A-G Ratio                   0.9 (L)           1.1 - 2.2      -TROPONIN-HIGH SENSITIVITY:   Collection Time: 03/29/23 10:00 PM       Result                      Value             Ref Range           Troponin-High Sensitiv*     <4                0 - 51 ng/L    -URINALYSIS W/ REFLEX CULTURE:   Collection Time: 03/29/23 10:05 PM  Specimen: Urine       Result                      Value             Ref Range           Color                       Yellow/Straw                          Appearance                  Clear             Clear               Specific gravity            <1.005            1.003 - 1.030       pH (UA)                     5.5               5.0 - 8.0           Protein                     Negative          Negative mg/*       Glucose                     Negative          Negative mg/*       Ketone                      Negative          Negative mg/*       Bilirubin                   Negative          Negative            Blood                       Negative          Negative            Urobilinogen                0.2               0.2 - 1.0 EU*       Nitrites                    Negative          Negative            Leukocyte Esterase          Negative          Negative            WBC                         0-4               0 - 5 /hpf          RBC                         0-5               0 - 3 /hpf          Bacteria                    Negative          Negative /hpf       UA:UC IF INDICATED                            Culture not *   Culture not indicated by UA result  -HCG URINE, QL:   Collection Time: 03/29/23 10:05 PM       Result                      Value             Ref Range           HCG urine, QL               Negative          Negative         Radiologic Studies -   CT ABD PELV WO CONT   Final Result    Bilateral nephrolithiasis with no hydronephrosis. Unchanged right adnexal    mass/fibroid. Mild hepatomegaly. CT Results  (Last 48 hours)               03/30/23 0020  CT ABD PELV WO CONT Final result    Impression:  Bilateral nephrolithiasis with no hydronephrosis. Unchanged   right adnexal   mass/fibroid. Mild hepatomegaly. Narrative:  INDICATION: abdominal pain        COMPARISON: May 31, 2022       TECHNIQUE:    Noncontrast thin axial images were obtained through the abdomen and   pelvis. Coronal and sagittal reconstructions were generated. CT dose reduction was     achieved through use of a standardized protocol tailored for this   examination   and automatic exposure control for dose modulation. NOTE:  The absence of IV contrast reduces the sensitivity for evaluation   of   visceral organs and vasculature including presence of mass lesions,   hemodynamically significant stenoses, dissections, mucosal abnormalities   etc.       FINDINGS:    LUNG BASES: No abnormality. LIVER: Mildly enlarged, 19 cm in length. GALLBLADDER: Unremarkable. SPLEEN: No enlargement or lesion. PANCREAS: No mass or ductal dilatation. ADRENALS: No mass. KIDNEYS: Multiple small bilateral renal calculi. No hydronephrosis. GI TRACT:  No bowel obstruction. Difficult to assess bowel wall thickening   given   lack of oral contrast material.   PERITONEUM: No free air or free fluid. APPENDIX: Unremarkable. RETROPERITONEUM: No aortic aneurysm. LYMPH NODES:  None enlarged. ADDITIONAL COMMENTS: N/A.       URINARY BLADDER: Unremarkable. REPRODUCTIVE ORGANS: The uterus is present. Right adnexal mass, 9.3 x 5.3   cm is   unchanged and may represent subserosal fibroid. LYMPH NODES:  None enlarged. FREE FLUID:  None. BONES: No destructive bone lesion. ADDITIONAL COMMENTS: N/A.              CXR Results  (Last 48 hours)    None        Medical Decision Making and ED Course  I am the first provider for this patient. I reviewed the vital signs, available nursing notes, past medical history, past surgical history, family history and social history. Vital Signs-Reviewed the patient's vital signs. Empty flowsheet group. EKG interpretation: (Preliminary)  Rhythm: sinus tachycardia; and regular . Rate (approx.): 114; Axis: normal; MI interval: normal; QRS interval: normal ; ST/T wave: non-specific changes; Other findings: borderline ekg. Records Reviewed: Prior medical records and Nursing notes    Provider Notes (Medical Decision Making):           ED Course:   Initial assessment performed. The patients presenting problems have been discussed, and they are in agreement with the care plan formulated and outlined with them. I have encouraged them to ask questions as they arise throughout their visit. Disposition      Discharged      DISCHARGE PLAN:  1. Cannot display discharge medications since this patient is not currently admitted. 2. Follow-up Information    None     3. Return to ED if worse     Diagnosis    Clinical Impression: Mild intermittent reactive airway disease with acute exacerbation  (primary encounter diagnosis)  Gastroesophageal reflux disease without esophagitis  Adenomatosis    Attestations:    Brien Brooks MD    Please note that this dictation was completed with [employer], the computer voice recognition software. Quite often unanticipated grammatical, syntax, homophones, and other interpretive errors are inadvertently transcribed by the computer software. Please disregard these errors. Please excuse any errors that have escaped final proofreading. Thank you.    ? Past Medical History:   Diagnosis Date    GERD (gastroesophageal reflux disease)     Hypertension        History reviewed. No pertinent surgical history. History reviewed. No pertinent family history.     Social History     Socioeconomic History    Marital status:      Spouse name: Not on file Number of children: Not on file    Years of education: Not on file    Highest education level: Not on file   Occupational History    Not on file   Tobacco Use    Smoking status: Never    Smokeless tobacco: Never   Vaping Use    Vaping Use: Never used   Substance and Sexual Activity    Alcohol use: Never    Drug use: Never    Sexual activity: Not on file   Other Topics Concern    Not on file   Social History Narrative    Not on file     Social Determinants of Health     Financial Resource Strain: Not on file   Food Insecurity: Not on file   Transportation Needs: Not on file   Physical Activity: Not on file   Stress: Not on file   Social Connections: Not on file   Intimate Partner Violence: Not on file   Housing Stability: Not on file         ALLERGIES: Patient has no known allergies. Review of Systems   Constitutional: Negative. HENT: Negative. Eyes: Negative. Respiratory:  Positive for cough, chest tightness, shortness of breath and wheezing. Cardiovascular: Negative. Gastrointestinal:  Positive for abdominal pain and nausea. Endocrine: Negative. Genitourinary:  Positive for dysuria. Musculoskeletal: Negative. Skin: Negative. Neurological: Negative. Hematological: Negative. Vitals:    03/29/23 2300 03/29/23 2315 03/29/23 2330 03/30/23 0000   BP: 129/89  138/82 137/82   Pulse:    (!) 110   Resp:       Temp:       SpO2:  99%     Weight:       Height:                Physical Exam  Vitals and nursing note reviewed. Constitutional:       Appearance: She is obese. HENT:      Head: Normocephalic. Eyes:      Extraocular Movements: Extraocular movements intact. Pupils: Pupils are equal, round, and reactive to light. Cardiovascular:      Rate and Rhythm: Regular rhythm. Tachycardia present. Heart sounds: Normal heart sounds. Pulmonary:      Effort: Pulmonary effort is normal.      Breath sounds: Normal breath sounds. Abdominal:      Tenderness:  There is abdominal tenderness. Skin:     Capillary Refill: Capillary refill takes less than 2 seconds. Neurological:      General: No focal deficit present. Mental Status: She is alert and oriented to person, place, and time. Medical Decision Making  41-year-old female presents with multiple complaints. Chest pain complaint is associated with coughing and wheezing. Symptoms are worse at night. Patient describes bronchospasm. It may be triggered by GERD. Abdominal pain is chronic. She has known uterine adenomas. She was advised to have surgery but has postponed that as a therapeutic option. Patient was treated with DuoNeb and Solu-Medrol. She reports feeling much better. Amount and/or Complexity of Data Reviewed  Labs: ordered. Radiology: ordered. ECG/medicine tests: ordered. Risk  Prescription drug management.            Procedures

## 2023-11-09 ENCOUNTER — APPOINTMENT (OUTPATIENT)
Facility: HOSPITAL | Age: 43
End: 2023-11-09
Payer: MEDICAID

## 2023-11-09 ENCOUNTER — HOSPITAL ENCOUNTER (EMERGENCY)
Facility: HOSPITAL | Age: 43
Discharge: HOME | End: 2023-11-09
Payer: MEDICAID

## 2023-11-09 VITALS
OXYGEN SATURATION: 100 % | BODY MASS INDEX: 34.31 KG/M2 | HEIGHT: 64 IN | TEMPERATURE: 98.9 F | SYSTOLIC BLOOD PRESSURE: 160 MMHG | WEIGHT: 201 LBS | DIASTOLIC BLOOD PRESSURE: 103 MMHG | RESPIRATION RATE: 18 BRPM | HEART RATE: 114 BPM

## 2023-11-09 DIAGNOSIS — Z34.91 FIRST TRIMESTER PREGNANCY: Primary | ICD-10-CM

## 2023-11-09 LAB
ALBUMIN SERPL-MCNC: 3.6 G/DL (ref 3.5–5)
ALBUMIN/GLOB SERPL: 0.8 (ref 1.1–2.2)
ALP SERPL-CCNC: 71 U/L (ref 45–117)
ALT SERPL-CCNC: 20 U/L (ref 12–78)
ANION GAP SERPL CALC-SCNC: 11 MMOL/L (ref 5–15)
APPEARANCE UR: CLEAR
AST SERPL W P-5'-P-CCNC: 12 U/L (ref 15–37)
BACTERIA URNS QL MICRO: NEGATIVE /HPF
BASOPHILS # BLD: 0 K/UL (ref 0–0.1)
BASOPHILS NFR BLD: 1 % (ref 0–1)
BILIRUB SERPL-MCNC: 0.3 MG/DL (ref 0.2–1)
BILIRUB UR QL: NEGATIVE
BUN SERPL-MCNC: 5 MG/DL (ref 6–20)
BUN/CREAT SERPL: 9 (ref 12–20)
CA-I BLD-MCNC: 8.9 MG/DL (ref 8.5–10.1)
CHLORIDE SERPL-SCNC: 101 MMOL/L (ref 97–108)
CO2 SERPL-SCNC: 25 MMOL/L (ref 21–32)
COLOR UR: NORMAL
CREAT SERPL-MCNC: 0.55 MG/DL (ref 0.55–1.02)
DIFFERENTIAL METHOD BLD: NORMAL
EOSINOPHIL # BLD: 0 K/UL (ref 0–0.4)
EOSINOPHIL NFR BLD: 1 % (ref 0–7)
ERYTHROCYTE [DISTWIDTH] IN BLOOD BY AUTOMATED COUNT: 13 % (ref 11.5–14.5)
GLOBULIN SER CALC-MCNC: 4.5 G/DL (ref 2–4)
GLUCOSE SERPL-MCNC: 115 MG/DL (ref 65–100)
GLUCOSE UR STRIP.AUTO-MCNC: NEGATIVE MG/DL
HCG SERPL-ACNC: ABNORMAL MIU/ML (ref 0–6)
HCT VFR BLD AUTO: 40.1 % (ref 35–47)
HGB BLD-MCNC: 12.7 G/DL (ref 11.5–16)
HGB UR QL STRIP: NEGATIVE
IMM GRANULOCYTES # BLD AUTO: 0 K/UL (ref 0–0.04)
IMM GRANULOCYTES NFR BLD AUTO: 0 % (ref 0–0.5)
KETONES UR QL STRIP.AUTO: NEGATIVE MG/DL
LEUKOCYTE ESTERASE UR QL STRIP.AUTO: NEGATIVE
LYMPHOCYTES # BLD: 1.9 K/UL (ref 0.8–3.5)
LYMPHOCYTES NFR BLD: 32 % (ref 12–49)
MAGNESIUM SERPL-MCNC: 1.8 MG/DL (ref 1.6–2.4)
MCH RBC QN AUTO: 27.4 PG (ref 26–34)
MCHC RBC AUTO-ENTMCNC: 31.7 G/DL (ref 30–36.5)
MCV RBC AUTO: 86.4 FL (ref 80–99)
MONOCYTES # BLD: 0.4 K/UL (ref 0–1)
MONOCYTES NFR BLD: 6 % (ref 5–13)
NEUTS SEG # BLD: 3.7 K/UL (ref 1.8–8)
NEUTS SEG NFR BLD: 60 % (ref 32–75)
NITRITE UR QL STRIP.AUTO: NEGATIVE
NRBC # BLD: 0 K/UL (ref 0–0.01)
NRBC BLD-RTO: 0 PER 100 WBC
PH UR STRIP: 7 (ref 5–8)
PLATELET # BLD AUTO: 230 K/UL (ref 150–400)
PMV BLD AUTO: 10.3 FL (ref 8.9–12.9)
POTASSIUM SERPL-SCNC: 3.2 MMOL/L (ref 3.5–5.1)
PROT SERPL-MCNC: 8.1 G/DL (ref 6.4–8.2)
PROT UR STRIP-MCNC: NEGATIVE MG/DL
RBC # BLD AUTO: 4.64 M/UL (ref 3.8–5.2)
RBC #/AREA URNS HPF: NORMAL /HPF (ref 0–3)
SODIUM SERPL-SCNC: 137 MMOL/L (ref 136–145)
SP GR UR REFRACTOMETRY: <1.005 (ref 1–1.03)
UROBILINOGEN UR QL STRIP.AUTO: 0.2 EU/DL (ref 0.2–1)
WBC # BLD AUTO: 6.1 K/UL (ref 3.6–11)
WBC URNS QL MICRO: NORMAL /HPF (ref 0–5)

## 2023-11-09 PROCEDURE — 84702 CHORIONIC GONADOTROPIN TEST: CPT

## 2023-11-09 PROCEDURE — 96374 THER/PROPH/DIAG INJ IV PUSH: CPT

## 2023-11-09 PROCEDURE — 85025 COMPLETE CBC W/AUTO DIFF WBC: CPT

## 2023-11-09 PROCEDURE — 83735 ASSAY OF MAGNESIUM: CPT

## 2023-11-09 PROCEDURE — 99284 EMERGENCY DEPT VISIT MOD MDM: CPT

## 2023-11-09 PROCEDURE — 80053 COMPREHEN METABOLIC PANEL: CPT

## 2023-11-09 PROCEDURE — 6360000002 HC RX W HCPCS: Performed by: EMERGENCY MEDICINE

## 2023-11-09 PROCEDURE — 81001 URINALYSIS AUTO W/SCOPE: CPT

## 2023-11-09 PROCEDURE — 96361 HYDRATE IV INFUSION ADD-ON: CPT

## 2023-11-09 PROCEDURE — 36415 COLL VENOUS BLD VENIPUNCTURE: CPT

## 2023-11-09 PROCEDURE — 76817 TRANSVAGINAL US OBSTETRIC: CPT

## 2023-11-09 PROCEDURE — 6370000000 HC RX 637 (ALT 250 FOR IP): Performed by: EMERGENCY MEDICINE

## 2023-11-09 PROCEDURE — 2580000003 HC RX 258: Performed by: EMERGENCY MEDICINE

## 2023-11-09 RX ORDER — ONDANSETRON 2 MG/ML
4 INJECTION INTRAMUSCULAR; INTRAVENOUS ONCE
Status: COMPLETED | OUTPATIENT
Start: 2023-11-09 | End: 2023-11-09

## 2023-11-09 RX ORDER — 0.9 % SODIUM CHLORIDE 0.9 %
1000 INTRAVENOUS SOLUTION INTRAVENOUS ONCE
Status: COMPLETED | OUTPATIENT
Start: 2023-11-09 | End: 2023-11-09

## 2023-11-09 RX ORDER — ACETAMINOPHEN 325 MG/1
650 TABLET ORAL
Status: COMPLETED | OUTPATIENT
Start: 2023-11-09 | End: 2023-11-09

## 2023-11-09 RX ADMIN — ONDANSETRON 4 MG: 2 INJECTION INTRAMUSCULAR; INTRAVENOUS at 12:29

## 2023-11-09 RX ADMIN — SODIUM CHLORIDE 1000 ML: 9 INJECTION, SOLUTION INTRAVENOUS at 12:30

## 2023-11-09 RX ADMIN — ACETAMINOPHEN 650 MG: 325 TABLET ORAL at 16:16

## 2023-11-09 NOTE — ED TRIAGE NOTES
Patient reports that she was cooking in the kitchen when she bent down and stood back up when she did this she got dizzy hot and her stomach hurt

## 2023-11-11 ENCOUNTER — HOSPITAL ENCOUNTER (EMERGENCY)
Facility: HOSPITAL | Age: 43
Discharge: HOME | End: 2023-11-11
Attending: EMERGENCY MEDICINE
Payer: MEDICAID

## 2023-11-11 VITALS
HEART RATE: 103 BPM | TEMPERATURE: 97.8 F | RESPIRATION RATE: 17 BRPM | SYSTOLIC BLOOD PRESSURE: 116 MMHG | OXYGEN SATURATION: 98 % | DIASTOLIC BLOOD PRESSURE: 72 MMHG

## 2023-11-11 DIAGNOSIS — O03.9 MISCARRIAGE: Primary | ICD-10-CM

## 2023-11-11 LAB
ALBUMIN SERPL-MCNC: 3.3 G/DL (ref 3.5–5)
ALBUMIN/GLOB SERPL: 0.8 (ref 1.1–2.2)
ALP SERPL-CCNC: 66 U/L (ref 45–117)
ALT SERPL-CCNC: 11 U/L (ref 12–78)
ANION GAP SERPL CALC-SCNC: 7 MMOL/L (ref 5–15)
AST SERPL W P-5'-P-CCNC: 12 U/L (ref 15–37)
BASOPHILS # BLD: 0 K/UL (ref 0–0.1)
BASOPHILS NFR BLD: 1 % (ref 0–1)
BILIRUB SERPL-MCNC: 0.3 MG/DL (ref 0.2–1)
BUN SERPL-MCNC: 6 MG/DL (ref 6–20)
BUN/CREAT SERPL: 12 (ref 12–20)
CA-I BLD-MCNC: 8.5 MG/DL (ref 8.5–10.1)
CHLORIDE SERPL-SCNC: 102 MMOL/L (ref 97–108)
CO2 SERPL-SCNC: 27 MMOL/L (ref 21–32)
CREAT SERPL-MCNC: 0.51 MG/DL (ref 0.55–1.02)
DIFFERENTIAL METHOD BLD: NORMAL
EOSINOPHIL # BLD: 0.1 K/UL (ref 0–0.4)
EOSINOPHIL NFR BLD: 1 % (ref 0–7)
ERYTHROCYTE [DISTWIDTH] IN BLOOD BY AUTOMATED COUNT: 12.8 % (ref 11.5–14.5)
GLOBULIN SER CALC-MCNC: 3.9 G/DL (ref 2–4)
GLUCOSE SERPL-MCNC: 119 MG/DL (ref 65–100)
HCG SERPL-ACNC: 7721 MIU/ML (ref 0–6)
HCT VFR BLD AUTO: 39 % (ref 35–47)
HGB BLD-MCNC: 12.4 G/DL (ref 11.5–16)
IMM GRANULOCYTES # BLD AUTO: 0 K/UL (ref 0–0.04)
IMM GRANULOCYTES NFR BLD AUTO: 0 % (ref 0–0.5)
LYMPHOCYTES # BLD: 2.5 K/UL (ref 0.8–3.5)
LYMPHOCYTES NFR BLD: 36 % (ref 12–49)
MCH RBC QN AUTO: 27.6 PG (ref 26–34)
MCHC RBC AUTO-ENTMCNC: 31.8 G/DL (ref 30–36.5)
MCV RBC AUTO: 86.9 FL (ref 80–99)
MONOCYTES # BLD: 0.4 K/UL (ref 0–1)
MONOCYTES NFR BLD: 5 % (ref 5–13)
NEUTS SEG # BLD: 3.9 K/UL (ref 1.8–8)
NEUTS SEG NFR BLD: 57 % (ref 32–75)
NRBC # BLD: 0 K/UL (ref 0–0.01)
NRBC BLD-RTO: 0 PER 100 WBC
PLATELET # BLD AUTO: 215 K/UL (ref 150–400)
PMV BLD AUTO: 10.8 FL (ref 8.9–12.9)
POTASSIUM SERPL-SCNC: 3.1 MMOL/L (ref 3.5–5.1)
PROT SERPL-MCNC: 7.2 G/DL (ref 6.4–8.2)
RBC # BLD AUTO: 4.49 M/UL (ref 3.8–5.2)
SODIUM SERPL-SCNC: 136 MMOL/L (ref 136–145)
WBC # BLD AUTO: 6.9 K/UL (ref 3.6–11)

## 2023-11-11 PROCEDURE — 85025 COMPLETE CBC W/AUTO DIFF WBC: CPT

## 2023-11-11 PROCEDURE — 96361 HYDRATE IV INFUSION ADD-ON: CPT

## 2023-11-11 PROCEDURE — 6360000002 HC RX W HCPCS: Performed by: EMERGENCY MEDICINE

## 2023-11-11 PROCEDURE — 6370000000 HC RX 637 (ALT 250 FOR IP): Performed by: EMERGENCY MEDICINE

## 2023-11-11 PROCEDURE — 96374 THER/PROPH/DIAG INJ IV PUSH: CPT

## 2023-11-11 PROCEDURE — 80053 COMPREHEN METABOLIC PANEL: CPT

## 2023-11-11 PROCEDURE — 84702 CHORIONIC GONADOTROPIN TEST: CPT

## 2023-11-11 PROCEDURE — 36415 COLL VENOUS BLD VENIPUNCTURE: CPT

## 2023-11-11 PROCEDURE — 2580000003 HC RX 258: Performed by: EMERGENCY MEDICINE

## 2023-11-11 PROCEDURE — 99284 EMERGENCY DEPT VISIT MOD MDM: CPT

## 2023-11-11 PROCEDURE — 96372 THER/PROPH/DIAG INJ SC/IM: CPT

## 2023-11-11 RX ORDER — METHYLERGONOVINE MALEATE 0.2 MG/1
0.2 TABLET ORAL
Qty: 3 TABLET | Refills: 0 | Status: SHIPPED | OUTPATIENT
Start: 2023-11-11 | End: 2023-11-12

## 2023-11-11 RX ORDER — MORPHINE SULFATE 10 MG/ML
8 INJECTION, SOLUTION INTRAMUSCULAR; INTRAVENOUS
Status: COMPLETED | OUTPATIENT
Start: 2023-11-11 | End: 2023-11-11

## 2023-11-11 RX ORDER — METHYLERGONOVINE MALEATE 0.2 MG/ML
200 INJECTION INTRAVENOUS ONCE
Status: COMPLETED | OUTPATIENT
Start: 2023-11-11 | End: 2023-11-11

## 2023-11-11 RX ORDER — 0.9 % SODIUM CHLORIDE 0.9 %
1000 INTRAVENOUS SOLUTION INTRAVENOUS ONCE
Status: COMPLETED | OUTPATIENT
Start: 2023-11-11 | End: 2023-11-11

## 2023-11-11 RX ORDER — HYDROCODONE BITARTRATE AND ACETAMINOPHEN 5; 325 MG/1; MG/1
1 TABLET ORAL EVERY 6 HOURS PRN
Qty: 12 TABLET | Refills: 0 | Status: SHIPPED | OUTPATIENT
Start: 2023-11-11 | End: 2023-11-14

## 2023-11-11 RX ORDER — IBUPROFEN 600 MG/1
600 TABLET ORAL 4 TIMES DAILY PRN
Qty: 20 TABLET | Refills: 0 | Status: SHIPPED | OUTPATIENT
Start: 2023-11-11 | End: 2023-11-16

## 2023-11-11 RX ADMIN — SODIUM CHLORIDE 1000 ML: 9 INJECTION, SOLUTION INTRAVENOUS at 13:03

## 2023-11-11 RX ADMIN — MORPHINE SULFATE 8 MG: 10 INJECTION, SOLUTION INTRAMUSCULAR; INTRAVENOUS at 15:24

## 2023-11-11 RX ADMIN — POTASSIUM BICARBONATE 40 MEQ: 782 TABLET, EFFERVESCENT ORAL at 16:14

## 2023-11-11 RX ADMIN — METHYLERGONOVINE MALEATE 200 MCG: 0.2 INJECTION INTRAVENOUS at 15:29

## 2023-11-11 ASSESSMENT — PAIN - FUNCTIONAL ASSESSMENT: PAIN_FUNCTIONAL_ASSESSMENT: 0-10

## 2023-11-11 ASSESSMENT — PAIN SCALES - GENERAL
PAINLEVEL_OUTOF10: 10
PAINLEVEL_OUTOF10: 2

## 2023-11-11 ASSESSMENT — PAIN DESCRIPTION - LOCATION: LOCATION: ABDOMEN

## 2023-11-11 NOTE — ED NOTES
Pt bled through clothes- assisted to bathroom - pad given.  MD at bedside with [address] [place] [address]  11/11/23 2993

## 2023-11-11 NOTE — ED NOTES
Called transfer center spoke to Cinthia to see if we could transfer patient to [address] [place] [address].  She is going to contact [address] [place] [address] and call us back     Elvia Payment  11/11/23 5399

## 2023-11-11 NOTE — ED NOTES
Disposable underwear and pads given to  wear home. Resting.  No c/o pain     Marcos Chirinos RN  11/11/23 9278

## 2023-11-11 NOTE — ED TRIAGE NOTES
Pt is 7 weeks pregnant. PT is . Pt reports vaginal bleeding and pelvic pain that started yesterday that is worsening today.  at bedside, pt does not speak english well.

## 2024-09-13 ENCOUNTER — HOSPITAL ENCOUNTER (EMERGENCY)
Facility: HOSPITAL | Age: 44
Discharge: HOME | End: 2024-09-13
Attending: EMERGENCY MEDICINE
Payer: MEDICAID

## 2024-09-13 VITALS
SYSTOLIC BLOOD PRESSURE: 160 MMHG | WEIGHT: 200 LBS | TEMPERATURE: 97.2 F | DIASTOLIC BLOOD PRESSURE: 94 MMHG | BODY MASS INDEX: 34.15 KG/M2 | HEART RATE: 103 BPM | RESPIRATION RATE: 16 BRPM | HEIGHT: 64 IN | OXYGEN SATURATION: 98 %

## 2024-09-13 DIAGNOSIS — G89.29 CHRONIC RIGHT-SIDED LOW BACK PAIN WITH RIGHT-SIDED SCIATICA: Primary | ICD-10-CM

## 2024-09-13 DIAGNOSIS — M54.41 CHRONIC RIGHT-SIDED LOW BACK PAIN WITH RIGHT-SIDED SCIATICA: Primary | ICD-10-CM

## 2024-09-13 PROCEDURE — 6360000002 HC RX W HCPCS: Performed by: EMERGENCY MEDICINE

## 2024-09-13 PROCEDURE — 96372 THER/PROPH/DIAG INJ SC/IM: CPT

## 2024-09-13 PROCEDURE — 6370000000 HC RX 637 (ALT 250 FOR IP): Performed by: EMERGENCY MEDICINE

## 2024-09-13 PROCEDURE — 99284 EMERGENCY DEPT VISIT MOD MDM: CPT

## 2024-09-13 RX ORDER — PREDNISONE 20 MG/1
40 TABLET ORAL ONCE
Status: COMPLETED | OUTPATIENT
Start: 2024-09-13 | End: 2024-09-13

## 2024-09-13 RX ORDER — KETOROLAC TROMETHAMINE 15 MG/ML
15 INJECTION, SOLUTION INTRAMUSCULAR; INTRAVENOUS ONCE
Status: COMPLETED | OUTPATIENT
Start: 2024-09-13 | End: 2024-09-13

## 2024-09-13 RX ORDER — ACETAMINOPHEN 500 MG
1000 TABLET ORAL
Status: COMPLETED | OUTPATIENT
Start: 2024-09-13 | End: 2024-09-13

## 2024-09-13 RX ORDER — PREDNISONE 20 MG/1
40 TABLET ORAL DAILY
Qty: 8 TABLET | Refills: 0 | Status: SHIPPED | OUTPATIENT
Start: 2024-09-14 | End: 2024-09-18

## 2024-09-13 RX ORDER — KETOROLAC TROMETHAMINE 10 MG/1
10 TABLET, FILM COATED ORAL EVERY 6 HOURS PRN
Qty: 20 TABLET | Refills: 0 | Status: SHIPPED | OUTPATIENT
Start: 2024-09-13

## 2024-09-13 RX ADMIN — PREDNISONE 40 MG: 20 TABLET ORAL at 09:06

## 2024-09-13 RX ADMIN — KETOROLAC TROMETHAMINE 15 MG: 15 INJECTION, SOLUTION INTRAMUSCULAR; INTRAVENOUS at 09:06

## 2024-09-13 RX ADMIN — ACETAMINOPHEN 1000 MG: 500 TABLET ORAL at 09:06

## 2024-09-13 ASSESSMENT — LIFESTYLE VARIABLES
HOW MANY STANDARD DRINKS CONTAINING ALCOHOL DO YOU HAVE ON A TYPICAL DAY: PATIENT DOES NOT DRINK
HOW OFTEN DO YOU HAVE A DRINK CONTAINING ALCOHOL: NEVER

## 2024-09-13 ASSESSMENT — PAIN SCALES - GENERAL: PAINLEVEL_OUTOF10: 9

## 2024-09-13 ASSESSMENT — PAIN - FUNCTIONAL ASSESSMENT: PAIN_FUNCTIONAL_ASSESSMENT: 0-10

## 2024-10-10 ENCOUNTER — HOSPITAL ENCOUNTER (OUTPATIENT)
Facility: HOSPITAL | Age: 44
Setting detail: RECURRING SERIES
Discharge: HOME | End: 2024-10-13
Payer: MEDICAID

## 2024-10-10 PROCEDURE — 97162 PT EVAL MOD COMPLEX 30 MIN: CPT

## 2024-10-10 PROCEDURE — G0283 ELEC STIM OTHER THAN WOUND: HCPCS

## 2024-10-10 PROCEDURE — 97110 THERAPEUTIC EXERCISES: CPT

## 2024-10-10 NOTE — THERAPY EVALUATION
50%s or greater to improve ease with overhead reaching as needed for self care   Long Term Goals: To be accomplished in 12 treatments.  1.Patient will report worst pain no greater than 2/10 to increase QOL and allow for independence with all hygienic self-care and ADL skills   2.Patient will demonstrate full lumbar AROM WFL to allow for lifting boxes at work skill without imposed restrictions   3.Patient will demonstrate 5/5 BLE strength to allow for home cleaning skills independently and without rest breaks needed  4.Patient will demonstrate pain free lumbar AROM WFL to improve ease with household chores like emptying the      Frequency / Duration: Patient to be seen 1-2 times per week for 12 treatments.    Patient/ Caregiver education and instruction: Diagnosis, prognosis, self care, activity modification, and exercises   [x]  Plan of care has been reviewed with SACHIN Rodriguez, PT       10/10/2024       2:16 PM        ===================================================================  I certify that the above Therapy Services are being furnished while the patient is under my care. I agree with the treatment plan and certify that this therapy is necessary.    Physician's Signature:_________________________   DATE:_________   TIME:________                           Celina Gonzales MD    ** Signature, Date and Time must be completed for valid certification **  Please sign and fax to [phone].  Thank you

## 2024-10-16 ENCOUNTER — HOSPITAL ENCOUNTER (OUTPATIENT)
Facility: HOSPITAL | Age: 44
Setting detail: RECURRING SERIES
Discharge: HOME | End: 2024-10-19
Payer: MEDICAID

## 2024-10-16 PROCEDURE — 97112 NEUROMUSCULAR REEDUCATION: CPT

## 2024-10-16 PROCEDURE — 97110 THERAPEUTIC EXERCISES: CPT

## 2024-10-16 NOTE — PROGRESS NOTES
PHYSICAL THERAPY - DAILY TREATMENT NOTE (updated 3/23)      Date: 10/16/2024          Patient Name:  Yuriy Oconnor :  1980   Medical   Diagnosis:  Other low back pain [M54.59] Treatment Diagnosis:  M54.59  OTHER LOWER BACK PAIN    Referral Source:  Celina Gonzales MD Insurance:   Payor: [facility] MEDICAID / Plan: [facility] CARDINAL CARE / Product Type: *No Product type* /                     Patient  verified yes     Visit #   Current  / Total 2 12   Time   In / Out 8:05 a 9:00 a   Total Treatment Time 55   Total Timed Codes 55         SUBJECTIVE    Pain Level (0-10 scale): 7-8    Any medication changes, allergies to medications, adverse drug reactions, diagnosis change, or new procedure performed?: [x] No    [] Yes (see summary sheet for update)  Medications: Verified on Patient Summary List    Subjective functional status/changes:     Patient reports that she has pain in her back and feet.   She feels like it Is about the same as IE but increases sometimes when she sits or stands for a long time.     OBJECTIVE      Therapeutic Procedures:  Tx Min Billable or 1:1 Min (if diff from Tx Min) Procedure, Rationale, Specifics   40  82288 Therapeutic Exercise (timed):  increase ROM, strength, coordination, balance, and proprioception to improve patient's ability to progress to PLOF and address remaining functional goals. (see flow sheet as applicable)     Details if applicable:  Includes discussion of benefit from transfer to a closer clinic due to no need for specific pelvic health services at this location    15  [id number] Neuromuscular Re-Education (timed):  improve balance, coordination, kinesthetic sense, posture, core stability and proprioception to improve patient's ability to develop conscious control of individual muscles and awareness of position of extremities in order to progress to PLOF and address remaining functional goals. (see flow sheet as applicable)    Details if applicable:

## 2024-10-21 ENCOUNTER — HOSPITAL ENCOUNTER (OUTPATIENT)
Facility: HOSPITAL | Age: 44
Setting detail: RECURRING SERIES
Discharge: HOME | End: 2024-10-24
Payer: MEDICAID

## 2024-10-21 PROCEDURE — 97110 THERAPEUTIC EXERCISES: CPT

## 2024-10-21 PROCEDURE — 97161 PT EVAL LOW COMPLEX 20 MIN: CPT

## 2024-10-21 NOTE — THERAPY EVALUATION
[facility]  [address] [place] [id number]  Ph: [phone]     Fax: [phone]             PHYSICAL THERAPY - EVALUATION/PLAN OF CARE NOTE (updated 3/23)      Date of Service: 10/21/2024        Patient Name:  Yuriy Oconnor :  1980   Medical   Diagnosis:  Low back pain, unspecified [M54.50] Treatment Diagnosis:  M54.59  OTHER LOWER BACK PAIN    Referral Source:  Celina Gonzales MD Provider #:  [id number]   Insurance: [facility]or: [facility] / Plan: [employer] [facility] CARDINAL CARE / Product Type: *No Product type* /      Visit #   Current  / Total 1    Time   In / Out 1410 1510   Total Treatment Time 60 minutes   Total Timed Codes 15 minutes   [x] Patient's date of birth verified      SUBJECTIVE  Pain Level (0-10 scale): 8/10  Changes in pain since onset: Intermittent    Any medication changes, allergies to medications, adverse drug reactions, diagnosis change, or new procedure performed?: [x] No    [] Yes (see summary sheet for update)  Medications: Verified on Patient Summary List    Subjective functional status/changes:     Video  was used throughout the session with code 93792 and reference number [id number]. Patient is 43 y.o. female who presents for physical therapy evaluation with lower back pain that started where her kidney on the R side. She then reports that the pain will spread to the R side of the lower back and into the RLE. She reports that the pain will go into her R thigh and into the R foot. She does not remember what started the pain, but did report a small fall without injury about 8 years ago. She does do a lot of lifting and house work. She does not have a follow up with MD at this time.     Onset Date: 1 year  Start of Care: 10/21/2024  PLOF: Independent with all ADL's   Mechanism of Injury: Chronic pain  Previous Treatment/Compliance: No previous treatment  Radiographs: None taken  What increases symptoms: standing for a

## 2024-10-24 ENCOUNTER — APPOINTMENT (OUTPATIENT)
Facility: HOSPITAL | Age: 44
End: 2024-10-24
Payer: MEDICAID

## 2024-10-24 ENCOUNTER — HOSPITAL ENCOUNTER (OUTPATIENT)
Facility: HOSPITAL | Age: 44
Setting detail: RECURRING SERIES
Discharge: HOME | End: 2024-10-27
Payer: MEDICAID

## 2024-10-24 PROCEDURE — 97110 THERAPEUTIC EXERCISES: CPT

## 2024-10-24 PROCEDURE — G0283 ELEC STIM OTHER THAN WOUND: HCPCS

## 2024-10-24 NOTE — PROGRESS NOTES
Visits OR 64 units; no end date         Therapeutic Exercise Flow Sheet:                                                                                    Running Visit #    EXERCISE   1   2   3   4   5   6   7   8   9   10      Nustep         Lvl 2 x10'                          LTR    x20                              SKTC    X30 sec B  private room  30\"x2 B                          Piriformis Stretch                                   Bridge      Private room  x10                             HS Stretch    X30 sec B gym   seated  30\"x2 B                         3 Way Swiss Ball Stretch    Seated flexion gym    30\"X2                                                                                                             Modalities to be included future treatment sessions: Electrical Stimulation, Heat Pack, and Lumbar Traction  Has HEP been provided to patient? [] No    [x] Yes                 Access Code: [email or web address]       Date Provided: 10/21/2024  [] Reviewed HEP    [] Progressed/Changed HEP, details:      GOALS  Short Term Goals, to be met within 5 treatments:  1. Patient will demonstrate independence and compliance with HEP in order to increase mobility and assist with carryover from PT services.  Status at last Eval/Progress Note: provided  Current Status: provided  Goal Met? No     2. Patient will be able to stand for >60 minutes without fatigue and pain >6/10 in lumbar spine.   Status at last Eval/Progress Note: 8/10 pain  Current Status: 8/10 pain  Goal Met? No     3. Patient will have 5/5 LE strength to be able to carry groceries into home without pain > 6/10.   Status at last Eval/Progress Note: 8/10  Current Status: 8/10  Goal Met? No    Long Term Goals, to be met within 10 treatments:  1.Patient will be able to walk for >60 minutes with less then 3/10 pain to perform household tasks.   Status at last Eval/Progress Note: 8/10  Current Status: 8/10  Goal Met? No     2. Patient will be able to squat to pick

## 2024-10-29 ENCOUNTER — APPOINTMENT (OUTPATIENT)
Facility: HOSPITAL | Age: 44
End: 2024-10-29
Payer: MEDICAID

## 2024-10-30 ENCOUNTER — HOSPITAL ENCOUNTER (OUTPATIENT)
Facility: HOSPITAL | Age: 44
Setting detail: RECURRING SERIES
Discharge: HOME | End: 2024-11-02
Payer: MEDICAID

## 2024-10-30 PROCEDURE — 97110 THERAPEUTIC EXERCISES: CPT

## 2024-10-30 PROCEDURE — G0283 ELEC STIM OTHER THAN WOUND: HCPCS

## 2024-10-30 NOTE — PROGRESS NOTES
PHYSICAL THERAPY - DAILY TREATMENT NOTE (updated 3/23)    Date of Service: 10/30/2024        Patient Name:  Yuriy Oconnor :  1980   Medical   Diagnosis:  Low back pain, unspecified [M54.50] Treatment Diagnosis:  M54.59  OTHER LOWER BACK PAIN    Referral Source:  Celina Gonzales MD Insurance:   Payor: [facility] MEDICAID / Plan: [facility] CARDINAL CARE / Product Type: *No Product type* /     [x] Patient's date of birth verified                Visit #   Current  / Total 3 10   Time   In / Out 1104  1206   Total Treatment Time (in minutes) 62    Total Timed Codes  (in minutes) 52    [facility] Totals Reminder:    8-22 min = 1 unit; 23-37 min = 2 units; 38-52 min = 3 units;  53-67 min = 4 units; 68-82 min = 5 units    SUBJECTIVE  Pain Level (0-10 scale): 7/10    Any medication changes, allergies to medications, adverse drug reactions, diagnosis change, or new procedure performed?: No  Medications: [x]  Verified on Patient Summary List    Subjective functional status/changes:     \"My pain is in my legs and back. I am doing okay today.\"    OBJECTIVE  Therapeutic Procedures:  Tx Min Billable or 1:1 Min (if diff from Tx Min) Procedure, Rationale, Specifics   52  10291 Therapeutic Exercise (timed):  increase ROM, strength, coordination, balance, and proprioception to improve patient's ability to progress to PLOF and address remaining functional goals. (see flow sheet as applicable)   52     Total Total   [x] Patient Education billed concurrently with other procedures    Modalities Rationale:     decrease pain and increase tissue extensibility to improve patient's ability to progress to PLOF and address remaining functional goals.    10   min [x] Estim Unattended,             []  NMES  [] PreMod       [x]  IFC  [] Other:  []w/US   []w/ice   [x]w/heat  Position: supine  Location: R low back  PRIVATE ROOM            min []  Mechanical Traction,        []  Cervical      []  Lumbar        []  Prone         []

## 2024-11-04 ENCOUNTER — APPOINTMENT (OUTPATIENT)
Facility: HOSPITAL | Age: 44
End: 2024-11-04
Payer: MEDICAID

## 2024-11-05 ENCOUNTER — HOSPITAL ENCOUNTER (OUTPATIENT)
Facility: HOSPITAL | Age: 44
Setting detail: RECURRING SERIES
Discharge: HOME | End: 2024-11-08
Payer: MEDICAID

## 2024-11-05 PROCEDURE — G0283 ELEC STIM OTHER THAN WOUND: HCPCS

## 2024-11-05 PROCEDURE — 97110 THERAPEUTIC EXERCISES: CPT

## 2024-11-05 NOTE — PROGRESS NOTES
PHYSICAL THERAPY - DAILY TREATMENT NOTE (updated 3/23)    Date of Service: 2024        Patient Name:  Yuriy Oconnor :  1980   Medical   Diagnosis:  Low back pain, unspecified [M54.50] Treatment Diagnosis:  M54.59  OTHER LOWER BACK PAIN    Referral Source:  Celina Gonzales MD Insurance:   Payor: [facility] MEDICAID / Plan: [facility] CARDINAL CARE / Product Type: *No Product type* /     [x] Patient's date of birth verified                Visit #   Current  / Total 4 10   Time   In / Out 1114 1200   Total Treatment Time (in minutes) 46    Total Timed Codes  (in minutes) 36    [facility] Totals Reminder:    8-22 min = 1 unit; 23-37 min = 2 units; 38-52 min = 3 units;  53-67 min = 4 units; 68-82 min = 5 units    SUBJECTIVE  Pain Level (0-10 scale): 4/10    Any medication changes, allergies to medications, adverse drug reactions, diagnosis change, or new procedure performed?: No  Medications: [x]  Verified on Patient Summary List    Subjective functional status/changes:     Pt states the pain in her back is better, but still having the pain down her R leg.    OBJECTIVE  Therapeutic Procedures:  Tx Min Billable or 1:1 Min (if diff from Tx Min) Procedure, Rationale, Specifics   36  65981 Therapeutic Exercise (timed):  increase ROM, strength, coordination, balance, and proprioception to improve patient's ability to progress to PLOF and address remaining functional goals. (see flow sheet as applicable)   36     Total Total   [x] Patient Education billed concurrently with other procedures    Modalities Rationale:     decrease pain and increase tissue extensibility to improve patient's ability to progress to PLOF and address remaining functional goals.    10   min [x] Estim Unattended,             []  NMES  [] PreMod       [x]  IFC  [] Other:  []w/US   []w/ice   [x]w/heat  Position: supine  Location: low back            min []  Mechanical Traction,        []  Cervical      []  Lumbar        []  Prone

## 2024-11-07 ENCOUNTER — HOSPITAL ENCOUNTER (OUTPATIENT)
Facility: HOSPITAL | Age: 44
Setting detail: RECURRING SERIES
Discharge: HOME | End: 2024-11-10
Payer: MEDICAID

## 2024-11-07 PROCEDURE — 97530 THERAPEUTIC ACTIVITIES: CPT

## 2024-11-07 PROCEDURE — 97012 MECHANICAL TRACTION THERAPY: CPT

## 2024-11-07 PROCEDURE — 97110 THERAPEUTIC EXERCISES: CPT

## 2024-11-07 PROCEDURE — G0283 ELEC STIM OTHER THAN WOUND: HCPCS

## 2024-11-07 NOTE — PROGRESS NOTES
[]w/US   []w/ice   [x]w/heat  Position: supine with traction   Location: low back     18       min [x]  Mechanical Traction,        []  Cervical      [x]  Lumbar        []  Prone         [x]  Supine           [x]  Intermitt.     []  Cont.     Lbs: 44/22   pounds  [x] With heat  [x] Simultaneously performed with E-Stim    min []  Ultrasound,    []Continuous   [] Pulsed  []1MHz   []3MHz Location:  W/cm2:    min  Unbilled []  Ice     []  Heat             Position:  Location:            min []  Vasopneumatic Device,      pre-treatment girth :  cm   post-treatment girth : cm   measured at (landmark       location) :  Pressure:       [] lo [] med [] hi   Temperature: [] lo [] med [] Hi    [] With ice    [] Simultaneously performed with Estim     Skin assessment post-treatment (if applicable):   [x]  intact    [x]  redness- no adverse reaction []redness - adverse reaction:        Pain Level at end of session (0-10 scale): 5/10    Assessment   Patient tolerated treatment working on functional back motion and mobility with reduced pain. Pt needed cuing to reduce pull with some and reduced a couple of her HEP to reduce pain in R low back.  Pt tolerated initiation of lumbar traction mid session with estim and MHP.  Patient will continue to benefit from skilled PT services to modify and progress therapeutic interventions, analyze and address functional mobility deficits, analyze and address ROM deficits, and analyze and address strength deficits to address functional deficits and attain remaining goals.    Progress toward goals / Updated goals:    PRECAUTIONS:   needed - female, if possible.  Any laying down exercises please do in a private room.         Date of Initial Evaluation: 10/21/2024  Date of last Progress Note: n/a  Visit # of last Progress Note: 1      Number of Insurance Authorized visits: [employer]: 16 Authorized Visits OR 64 units; no end date         Therapeutic Exercise Flow Sheet:

## 2024-11-12 ENCOUNTER — HOSPITAL ENCOUNTER (OUTPATIENT)
Facility: HOSPITAL | Age: 44
Setting detail: RECURRING SERIES
Discharge: HOME | End: 2024-11-15
Payer: MEDICAID

## 2024-11-12 PROCEDURE — 97012 MECHANICAL TRACTION THERAPY: CPT

## 2024-11-12 PROCEDURE — 97110 THERAPEUTIC EXERCISES: CPT

## 2024-11-12 PROCEDURE — G0283 ELEC STIM OTHER THAN WOUND: HCPCS

## 2024-11-12 NOTE — PROGRESS NOTES
PHYSICAL THERAPY - DAILY TREATMENT NOTE (updated 3/23)    Date of Service: 2024        Patient Name:  Yuriy Oconnor :  1980   Medical   Diagnosis:  Low back pain, unspecified [M54.50] Treatment Diagnosis:  M54.59  OTHER LOWER BACK PAIN    Referral Source:  Celina Gonzales MD Insurance:   Payor: [facility] MEDICAID / Plan: [facility] [place]AL CARE / Product Type: *No Product type* /     [x] Patient's date of birth verified                Visit #   Current  / Total 6 10   Time   In / Out 1108 1213   Total Treatment Time (in minutes) 65   Total Timed Codes  (in minutes) 47   [facility] Totals Reminder:    8-22 min = 1 unit; 23-37 min = 2 units; 38-52 min = 3 units;  53-67 min = 4 units; 68-82 min = 5 units      SUBJECTIVE  Pain Level (0-10 scale): 8/10    Any medication changes, allergies to medications, adverse drug reactions, diagnosis change, or new procedure performed?: No  Medications: [x]  Verified on Patient Summary List    Subjective functional status/changes:     Video  #[id number] used throughout session. \"I am feeling ok today. Yesterday my BP was a little high, but a lot of times seeing the machine makes it high.\" \"I am supposed to have surgery with my OB, and I go back to the doctor  to find out the details, as well as an upcoming MRI of my abdomen.\"      OBJECTIVE  Therapeutic Procedures:  Tx Min Billable or 1:1 Min (if diff from Tx Min) Procedure, Rationale, Specifics   47  86308 Therapeutic Exercise (timed):  increase ROM, strength, coordination, balance, and proprioception to improve patient's ability to progress to PLOF and address remaining functional goals. (see flow sheet as applicable)        47     Total Total   [x] Patient Education billed concurrently with other procedures    Modalities Rationale:     decrease pain, increase tissue extensibility, and increase muscle contraction/control to improve patient's ability to progress to PLOF and address remaining

## 2024-11-14 ENCOUNTER — HOSPITAL ENCOUNTER (OUTPATIENT)
Facility: HOSPITAL | Age: 44
Setting detail: RECURRING SERIES
Discharge: HOME | End: 2024-11-17
Payer: MEDICAID

## 2024-11-14 PROCEDURE — 97012 MECHANICAL TRACTION THERAPY: CPT

## 2024-11-14 PROCEDURE — G0283 ELEC STIM OTHER THAN WOUND: HCPCS

## 2024-11-14 PROCEDURE — 97110 THERAPEUTIC EXERCISES: CPT

## 2024-11-14 NOTE — PROGRESS NOTES
[]  Intermitt.     []  Cont.     Lbs:    pounds  [] With heat  [] Simultaneously performed with E-Stim    min []  Ultrasound,    []Continuous   [] Pulsed  []1MHz   []3MHz Location:  W/cm2:    min  Unbilled []  Ice     []  Heat             Position:  Location:            min []  Vasopneumatic Device,      pre-treatment girth :  cm   post-treatment girth : cm   measured at (landmark       location) :  Pressure:       [] lo [] med [] hi   Temperature: [] lo [] med [] Hi    [] With ice    [] Simultaneously performed with Estim     Skin assessment post-treatment (if applicable):   [x]  intact    [x]  redness- no adverse reaction []redness - adverse reaction:        Pain Level at end of session (0-10 scale): 3/10    Assessment   Patient tolerated treatment working on functional back motion and utilization of traction with estim and MHP.  #[id number] use for entire session. Pt tolerated traction after warm up and performed supine stretching with cues as able to understand with interpretor..   Patient will continue to benefit from skilled PT services to modify and progress therapeutic interventions, analyze and address functional mobility deficits, analyze and address ROM deficits, analyze and address strength deficits, analyze and address soft tissue restrictions, and analyze and cue for proper movement patterns to address functional deficits and attain remaining goals.    Progress toward goals / Updated goals:    PRECAUTIONS:   needed - female, if possible.  Any laying down exercises please do in a private room.         Date of Initial Evaluation: 10/21/2024  Date of last Progress Note: n/a  Visit # of last Progress Note: 1      Number of Insurance Authorized visits: Kieran: 16 Authorized Visits OR 64 units; no end date         Therapeutic Exercise Flow Sheet:                                                                                    Running Visit #    EXERCISE   1   2   3   4   5   6   7

## 2024-11-18 ENCOUNTER — HOSPITAL ENCOUNTER (OUTPATIENT)
Facility: HOSPITAL | Age: 44
Setting detail: RECURRING SERIES
Discharge: HOME | End: 2024-11-21
Payer: MEDICAID

## 2024-11-18 PROCEDURE — 97012 MECHANICAL TRACTION THERAPY: CPT

## 2024-11-18 PROCEDURE — G0283 ELEC STIM OTHER THAN WOUND: HCPCS

## 2024-11-18 PROCEDURE — 97110 THERAPEUTIC EXERCISES: CPT

## 2024-11-18 NOTE — PROGRESS NOTES
PHYSICAL THERAPY - DAILY TREATMENT NOTE (updated 3/23)    Date of Service: 2024        Patient Name:  Yuriy Oconnor :  1980   Medical   Diagnosis:  Low back pain, unspecified [M54.50] Treatment Diagnosis:  M54.59  OTHER LOWER BACK PAIN    Referral Source:  Celina Gonzales MD Insurance:   Payor: [facility] MEDICAID / Plan: [facility] CARDINAL CARE / Product Type: *No Product type* /     [x] Patient's date of birth verified                Visit #   Current  / Total 8 10   Time   In / Out 1310 1402   Total Treatment Time (in minutes) 52    Total Timed Codes  (in minutes) 32    [facility] Totals Reminder:    8-22 min = 1 unit; 23-37 min = 2 units; 38-52 min = 3 units;  53-67 min = 4 units; 68-82 min = 5 units      SUBJECTIVE  Pain Level (0-10 scale): 5-6/10    Any medication changes, allergies to medications, adverse drug reactions, diagnosis change, or new procedure performed?: No  Medications: [x]  Verified on Patient Summary List    Subjective functional status/changes:     \"I am doing alright today. I go get MRI of my abdomen and pelvis tomorrow.\"    OBJECTIVE  Therapeutic Procedures:  Tx Min Billable or 1:1 Min (if diff from Tx Min) Procedure, Rationale, Specifics   32  26027 Therapeutic Exercise (timed):  increase ROM, strength, coordination, balance, and proprioception to improve patient's ability to progress to PLOF and address remaining functional goals. (see flow sheet as applicable)   32     Total Total   [x] Patient Education billed concurrently with other procedures    Modalities Rationale:     decrease pain, increase tissue extensibility, and increase muscle contraction/control to improve patient's ability to progress to PLOF and address remaining functional goals.    20   min [x] Estim Unattended,             []  NMES  [] PreMod       [x]  IFC  [] Other:  []w/US   []w/ice   [x]w/heat  Position: supine during traction  Location: lower back       20     min [x]  Mechanical Traction,

## 2024-11-19 ENCOUNTER — HOSPITAL ENCOUNTER (OUTPATIENT)
Facility: HOSPITAL | Age: 44
Discharge: HOME | End: 2024-11-22
Attending: STUDENT IN AN ORGANIZED HEALTH CARE EDUCATION/TRAINING PROGRAM
Payer: MEDICAID

## 2024-11-19 DIAGNOSIS — D25.9 UTERINE LEIOMYOMA, UNSPECIFIED LOCATION: ICD-10-CM

## 2024-11-19 PROCEDURE — 6360000004 HC RX CONTRAST MEDICATION: Performed by: STUDENT IN AN ORGANIZED HEALTH CARE EDUCATION/TRAINING PROGRAM

## 2024-11-19 PROCEDURE — A9579 GAD-BASE MR CONTRAST NOS,1ML: HCPCS | Performed by: STUDENT IN AN ORGANIZED HEALTH CARE EDUCATION/TRAINING PROGRAM

## 2024-11-19 PROCEDURE — 72197 MRI PELVIS W/O & W/DYE: CPT

## 2024-11-19 PROCEDURE — 74183 MRI ABD W/O CNTR FLWD CNTR: CPT

## 2024-11-19 RX ADMIN — GADOTERIDOL 19 ML: 279.3 INJECTION, SOLUTION INTRAVENOUS at 09:24

## 2024-11-20 ENCOUNTER — HOSPITAL ENCOUNTER (OUTPATIENT)
Facility: HOSPITAL | Age: 44
Setting detail: RECURRING SERIES
Discharge: HOME | End: 2024-11-23
Payer: MEDICAID

## 2024-11-20 PROCEDURE — 97012 MECHANICAL TRACTION THERAPY: CPT

## 2024-11-20 PROCEDURE — 97110 THERAPEUTIC EXERCISES: CPT

## 2024-11-20 PROCEDURE — G0283 ELEC STIM OTHER THAN WOUND: HCPCS

## 2024-11-20 NOTE — PROGRESS NOTES
order to increase mobility and assist with carryover from PT services.  Status at last Eval/Progress Note: provided  Current Status: provided  Goal Met? No     2. Patient will be able to stand for >60 minutes without fatigue and pain >6/10 in lumbar spine.   Status at last Eval/Progress Note: 8/10 pain  Current Status: 8/10 pain  Goal Met? No     3. Patient will have 5/5 LE strength to be able to carry groceries into home without pain > 6/10.   Status at last Eval/Progress Note: 8/10  Current Status: 8/10  Goal Met? No     Long Term Goals, to be met within 10 treatments:  1.Patient will be able to walk for >60 minutes with less then 3/10 pain to perform household tasks.   Status at last Eval/Progress Note: 8/10  Current Status: 8/10  Goal Met? No     2. Patient will be able to squat to pick objects off the floor without limitations for house and yard work.  Status at last Eval/Progress Note: pain  Current Status: pain  Goal Met? No     3. Patient will improve lumbar sidebend to 45 deg B to be able to perform tasks in the home without any restrictions.    Status at last Eval/Progress Note: Side Bending R 30 degrees              L 45 degrees  Current Status: Side Bending R 30 degrees              L 45 degrees  Goal Met? No        []  See Progress Note/Recertification  []  See Discharge Summary    PLAN  [x]  Continue plan of care  [x]  Upgrade activities as tolerated  []  Discharge due to :  []  Other:      Bindu Corcoran, PT, DPT       11/20/2024       10:11 AM

## 2024-11-27 ENCOUNTER — HOSPITAL ENCOUNTER (OUTPATIENT)
Facility: HOSPITAL | Age: 44
Setting detail: RECURRING SERIES
Discharge: HOME | End: 2024-11-30
Payer: MEDICAID

## 2024-11-27 PROCEDURE — 97012 MECHANICAL TRACTION THERAPY: CPT

## 2024-11-27 PROCEDURE — 97110 THERAPEUTIC EXERCISES: CPT

## 2024-11-27 PROCEDURE — G0283 ELEC STIM OTHER THAN WOUND: HCPCS

## 2024-11-27 NOTE — PROGRESS NOTES
[facility]  [address] [place] [id number]  Ph: [phone]     Fax: [phone]    PHYSICAL THERAPY PROGRESS NOTE  Patient Name:  Yuriy Oconnor :  1980   Treatment/Medical Diagnosis: Low back pain, unspecified [M54.50]   Referral Source:  Celina Gonzales MD     Date of Initial Visit:  10/21/2024 Attended Visits:  10 Missed Visits:  1       Summary of Care / Assessment / Recommendations:   Patient has attended 10 visits for lower back pain with radicular symptoms. She has demonstrated some strength improvements at this time. She has fair ROM still with some irritation when returning from a flexed position to upright and with extension of the spine. She is demonstrating less radicular symptoms from 9/10 to 7/10 at this time. She was encouraged to continue with her HEP to improve carryover between treatment sessions. She also was encouraged to return to MD for possible referral to orthopedic for continued pain in lumbar spine. Patient will continue to benefit from skilled PT services to modify and progress therapeutic interventions, analyze and address functional mobility deficits, analyze and address ROM deficits, analyze and address strength deficits, analyze and address soft tissue restrictions, and analyze and cue for proper movement patterns to address functional deficits and attain remaining goal      Progress Toward Goals:   Short Term Goals, to be met within 5 treatments:  1. Patient will demonstrate independence and compliance with HEP in order to increase mobility and assist with carryover from PT services.  Status at last Eval/Progress Note: provided  Current Status: performing sometimes  Goal Met? In Progress     2. Patient will be able to stand for >60 minutes without fatigue and pain >6/10 in lumbar spine.   Status at last Eval/Progress Note: 8/10 pain  Current Status: not standing as long, but still goes above 6/10 after 30 min  Goal Met? No

## 2024-12-03 ENCOUNTER — TRANSCRIBE ORDERS (OUTPATIENT)
Facility: HOSPITAL | Age: 44
End: 2024-12-03

## 2024-12-03 ENCOUNTER — HOSPITAL ENCOUNTER (OUTPATIENT)
Facility: HOSPITAL | Age: 44
Setting detail: RECURRING SERIES
Discharge: HOME | End: 2024-12-06
Payer: MEDICAID

## 2024-12-03 DIAGNOSIS — N93.8: ICD-10-CM

## 2024-12-03 DIAGNOSIS — D25.9 UTERINE LEIOMYOMA, UNSPECIFIED LOCATION: Primary | ICD-10-CM

## 2024-12-03 PROCEDURE — 97110 THERAPEUTIC EXERCISES: CPT

## 2024-12-03 PROCEDURE — G0283 ELEC STIM OTHER THAN WOUND: HCPCS

## 2024-12-03 PROCEDURE — 97012 MECHANICAL TRACTION THERAPY: CPT

## 2024-12-05 ENCOUNTER — HOSPITAL ENCOUNTER (OUTPATIENT)
Facility: HOSPITAL | Age: 44
Setting detail: RECURRING SERIES
Discharge: HOME | End: 2024-12-08
Payer: MEDICAID

## 2024-12-05 PROCEDURE — G0283 ELEC STIM OTHER THAN WOUND: HCPCS

## 2024-12-05 PROCEDURE — 97012 MECHANICAL TRACTION THERAPY: CPT

## 2024-12-05 PROCEDURE — 97110 THERAPEUTIC EXERCISES: CPT

## 2024-12-05 NOTE — PROGRESS NOTES
[x]  Intermitt.     []  Cont.     Lbs: 75# maximal 40# minimal  [x] With heat  [x] Simultaneously performed with E-Stim    min []  Ultrasound,    []Continuous   [] Pulsed  []1MHz   []3MHz Location:  W/cm2:    min  Unbilled []  Ice     []  Heat             Position:  Location:            min []  Vasopneumatic Device,      pre-treatment girth :  cm   post-treatment girth : cm   measured at (landmark       location) :  Pressure:       [] lo [] med [] hi   Temperature: [] lo [] med [] Hi    [] With ice    [] Simultaneously performed with Estim     Skin assessment post-treatment (if applicable):   [x]  intact    [x]  redness- no adverse reaction []redness - adverse reaction:        Pain Level at end of session (0-10 scale): 0/10    Assessment   Patient tolerated treatment fair.  Lisset #[id number]. Progressed core exercises today with sitting on SB - pt required 1 UE support at this time. Also progressed to DKTC with SB - pt denied pain, but stated it felt like something was \"moving up\" in her abdomen. Patient will continue to benefit from skilled PT services to modify and progress therapeutic interventions, analyze and address functional mobility deficits, analyze and address ROM deficits, and analyze and address strength deficits to address functional deficits and attain remaining goals.    PRECAUTIONS:   needed - female, if possible.  Any laying down exercises please do in a private room.         Date of Initial Evaluation: 10/21/2024  Date of last Progress Note: n/a  Visit # of last Progress Note: 1      Number of Insurance Authorized visits: [employer]: 16 Authorized Visits OR 64 units; no end date         Therapeutic Exercise Flow Sheet:                                                                                    Running Visit #    EXERCISE   11   12   13   14   15   16   17   18   19   20      Nustep Lvl 3 x12 min   Lvl 3 x5'                       LTR    X20 B                        SKTC

## 2024-12-11 ENCOUNTER — HOSPITAL ENCOUNTER (OUTPATIENT)
Facility: HOSPITAL | Age: 44
Setting detail: RECURRING SERIES
Discharge: HOME | End: 2024-12-14
Payer: MEDICAID

## 2024-12-11 PROCEDURE — 97012 MECHANICAL TRACTION THERAPY: CPT

## 2024-12-11 PROCEDURE — 97110 THERAPEUTIC EXERCISES: CPT

## 2024-12-11 PROCEDURE — G0283 ELEC STIM OTHER THAN WOUND: HCPCS

## 2024-12-11 NOTE — PROGRESS NOTES
PHYSICAL THERAPY - DAILY TREATMENT NOTE (updated 3/23)    Date of Service: 2024        Patient Name:  Yuriy Oconnor :  1980   Medical   Diagnosis:  Low back pain, unspecified [M54.50] Treatment Diagnosis:  M54.59  OTHER LOWER BACK PAIN    Referral Source:  Celina Gonzales MD Insurance:   Payor: [facility] MEDICAID / Plan: [facility] CARDINAL CARE / Product Type: *No Product type* /     [x] Patient's date of birth verified                Visit #   Current  / Total 13 16   Time   In / Out 1316 1404   Total Treatment Time (in minutes) 48    Total Timed Codes  (in minutes) 28    [facility] Totals Reminder:    8-22 min = 1 unit; 23-37 min = 2 units; 38-52 min = 3 units;  53-67 min = 4 units; 68-82 min = 5 units    SUBJECTIVE  Pain Level (0-10 scale): 6/10    Any medication changes, allergies to medications, adverse drug reactions, diagnosis change, or new procedure performed?: No  Medications: [x]  Verified on Patient Summary List    Subjective functional status/changes:     \"I did a lot of cooking yesterday.\" Pt almost 15 minutes late today.    OBJECTIVE  Therapeutic Procedures:  Tx Min Billable or 1:1 Min (if diff from Tx Min) Procedure, Rationale, Specifics   28  94546 Therapeutic Exercise (timed):  increase ROM, strength, coordination, balance, and proprioception to improve patient's ability to progress to PLOF and address remaining functional goals. (see flow sheet as applicable)   28     Total Total   [x] Patient Education billed concurrently with other procedures    Modalities Rationale:     decrease pain and increase tissue extensibility to improve patient's ability to progress to PLOF and address remaining functional goals.    20   min [x] Estim Unattended,             []  NMES  [] PreMod       [x]  IFC  [] Other:  []w/US   []w/ice   [x]w/heat  Position: w/ traction  Location: low back       20     min [x]  Mechanical Traction,        []  Cervical      [x]  Lumbar        []  Prone         [x]

## 2024-12-13 ENCOUNTER — APPOINTMENT (OUTPATIENT)
Facility: HOSPITAL | Age: 44
End: 2024-12-13
Payer: MEDICAID

## 2024-12-18 ENCOUNTER — HOSPITAL ENCOUNTER (OUTPATIENT)
Facility: HOSPITAL | Age: 44
Setting detail: RECURRING SERIES
Discharge: HOME | End: 2024-12-21
Payer: MEDICAID

## 2024-12-18 PROCEDURE — 97012 MECHANICAL TRACTION THERAPY: CPT

## 2024-12-18 PROCEDURE — G0283 ELEC STIM OTHER THAN WOUND: HCPCS

## 2024-12-18 PROCEDURE — 97110 THERAPEUTIC EXERCISES: CPT

## 2024-12-18 NOTE — PROGRESS NOTES
tasks.   Status at last Eval/Progress Note: 8/10  Current Status: takes breaks due to pain after about 30 min  Goal Met? In Progress     2. Patient will be able to squat to pick objects off the floor without limitations for house and yard work.  Status at last Eval/Progress Note: pain  Current Status: not as bad when performing 6/10  Goal Met? In Progress     3. Patient will improve lumbar sidebend to 45 deg B to be able to perform tasks in the home without any restrictions.    Status at last Eval/Progress Note: Side Bending R 30 degrees              L 45 degrees  Current Status: Side Bending R 45 degrees              L 45 degrees  Goal Met? Yes        []  See Progress Note/Recertification  []  See Discharge Summary    PLAN  [x]  Continue plan of care  [x]  Upgrade activities as tolerated  []  Discharge due to :  []  Other:      HEMAL PACE PTA, RAFFI       12/18/2024       10:28 AM

## 2024-12-20 ENCOUNTER — HOSPITAL ENCOUNTER (OUTPATIENT)
Facility: HOSPITAL | Age: 44
Setting detail: RECURRING SERIES
Discharge: HOME | End: 2024-12-23
Payer: MEDICAID

## 2024-12-20 PROCEDURE — 97012 MECHANICAL TRACTION THERAPY: CPT

## 2024-12-20 PROCEDURE — G0283 ELEC STIM OTHER THAN WOUND: HCPCS

## 2024-12-20 PROCEDURE — 97110 THERAPEUTIC EXERCISES: CPT

## 2024-12-20 NOTE — PROGRESS NOTES
PHYSICAL THERAPY - DAILY TREATMENT NOTE (updated 3/23)    Date of Service: 2024        Patient Name:  Yuriy Oconnor :  1980   Medical   Diagnosis:  Low back pain, unspecified [M54.50] Treatment Diagnosis:  M54.31  SCIATICA, RIGHT SIDE and M54.59  OTHER LOWER BACK PAIN    Referral Source:  Celina Gonzales MD Insurance:   Payor: [facility] MEDICAID / Plan: [facility] CARDINAL CARE / Product Type: *No Product type* /     [x] Patient's date of birth verified                Visit #   Current  / Total 15 16   Time   In / Out 1307 1349   Total Treatment Time (in minutes) 43   Total Timed Codes  (in minutes) 22    [facility] Totals Reminder:    8-22 min = 1 unit; 23-37 min = 2 units; 38-52 min = 3 units;  53-67 min = 4 units; 68-82 min = 5 units      SUBJECTIVE  Pain Level (0-10 scale): 4/10     Any medication changes, allergies to medications, adverse drug reactions, diagnosis change, or new procedure performed?: No  Medications: [x]  Verified on Patient Summary List    Subjective functional status/changes:     \"My back and down my leg are hurting today\" All conversations and reports via interrupter.    OBJECTIVE  Therapeutic Procedures:  Tx Min Billable or 1:1 Min (if diff from Tx Min) Procedure, Rationale, Specifics   22  39319 Therapeutic Exercise (timed):  increase ROM, strength, coordination, balance, and proprioception to improve patient's ability to progress to PLOF and address remaining functional goals. (see flow sheet as applicable)   22     Total Total   [x] Patient Education billed concurrently with other procedures    Modalities Rationale:     decrease pain, increase tissue extensibility, and increase muscle contraction/control to improve patient's ability to progress to PLOF and address remaining functional goals.  20     min [x] Estim Unattended,             []  NMES  [] PreMod       [x]  IFC  [] Other:  []w/US   []w/ice   [x]w/heat  Position: supine with traction   Location: low back

## 2024-12-23 ENCOUNTER — HOSPITAL ENCOUNTER (OUTPATIENT)
Facility: HOSPITAL | Age: 44
Setting detail: RECURRING SERIES
Discharge: HOME | End: 2024-12-26
Payer: MEDICAID

## 2024-12-23 PROCEDURE — G0283 ELEC STIM OTHER THAN WOUND: HCPCS

## 2024-12-23 PROCEDURE — 97012 MECHANICAL TRACTION THERAPY: CPT

## 2024-12-23 PROCEDURE — 97110 THERAPEUTIC EXERCISES: CPT

## 2024-12-23 NOTE — PROGRESS NOTES
PHYSICAL THERAPY - DAILY TREATMENT NOTE (updated 3/23)    Date of Service: 2024        Patient Name:  Yuriy Oconnor :  1980   Medical   Diagnosis:  Low back pain, unspecified [M54.50] Treatment Diagnosis:  M54.31  SCIATICA, RIGHT SIDE and M54.59  OTHER LOWER BACK PAIN    Referral Source:  Celina Gonzales MD Insurance:   Payor: [facility] MEDICAID / Plan: [facility] CARDINAL CARE / Product Type: *No Product type* /     [x] Patient's date of birth verified                Visit #   Current  / Total 16 16   Time   In / Out 0912 1000   Total Treatment Time (in minutes) 48   Total Timed Codes  (in minutes) 28    [facility] Totals Reminder:    8-22 min = 1 unit; 23-37 min = 2 units; 38-52 min = 3 units;  53-67 min = 4 units; 68-82 min = 5 units      SUBJECTIVE  Pain Level (0-10 scale): 4/10     Any medication changes, allergies to medications, adverse drug reactions, diagnosis change, or new procedure performed?: No  Medications: [x]  Verified on Patient Summary List    Subjective functional status/changes:     \"I am doing better, but still pain in my lower back.\" All conversations and reports via interrupter.    OBJECTIVE  Therapeutic Procedures:  Tx Min Billable or 1:1 Min (if diff from Tx Min) Procedure, Rationale, Specifics   28  21856 Therapeutic Exercise (timed):  increase ROM, strength, coordination, balance, and proprioception to improve patient's ability to progress to PLOF and address remaining functional goals. (see flow sheet as applicable)   28     Total Total   [x] Patient Education billed concurrently with other procedures    Modalities Rationale:     decrease pain, increase tissue extensibility, and increase muscle contraction/control to improve patient's ability to progress to PLOF and address remaining functional goals.  20     min [x] Estim Unattended,             []  NMES  [] PreMod       [x]  IFC  [] Other:  []w/US   []w/ice   [x]w/heat  Position: supine with traction   Location: low

## 2024-12-23 NOTE — PROGRESS NOTES
[facility]  [address] [place] [id number]  Ph: [phone]     Fax: [phone]    PHYSICAL THERAPY MEDICAID DISCHARGE SUMMARY  Patient Name: Yuriy Oconnor : 1980   Treatment/Medical Diagnosis: Low back pain, unspecified [M54.50]   Referral Source: Celina Gonzales MD     Date of Initial Visit: 10/21/2024 Attended Visits: 16 Missed Visits: 1     SUMMARY OF TREATMENT/ CURRENT STATUS  Patient tolerated treatment well today. She has attended 16 visits with mild improvements in ROM, strength, and pain levels. She still has high pain with prolonged standing or walking for > 30 minutes. She has some radicular symptoms into the RLE still. She does have some issues going on with her female organs at this time which could possibly be referring some symptoms or more complicated cause from the lumbar spine. She was given name and number to an spinal orthopedic specialist today with recommendations to make an appt for more testing. She may also be returning to OBGYN for more testing for other complications. She was encouraged to continue with HEP in the meantime while she is seeing obtaining these follow up appts. She was encouraged to contact us with any questions or concerns. She is DC from PT services at this time to allow time to get more thorough examination by MD into continued higher pain levels.     GOALS  Short Term Goals, to be met within 5 treatments:  1. Patient will demonstrate independence and compliance with HEP in order to increase mobility and assist with carryover from PT services.  Status at last Eval/Progress Note: performing sometimes  Current Status: performing the ones she can  Goal Met? Yes     2. Patient will be able to stand for >60 minutes without fatigue and pain >6/10 in lumbar spine.   Status at last Eval/Progress Note: not standing as long, but still goes above 6/10 after 30 min  Current Status: can do > 30 min without more than

## 2025-01-15 ENCOUNTER — HOSPITAL ENCOUNTER (EMERGENCY)
Facility: HOSPITAL | Age: 45
Discharge: HOME | End: 2025-01-15
Attending: EMERGENCY MEDICINE
Payer: MEDICAID

## 2025-01-15 VITALS
RESPIRATION RATE: 18 BRPM | TEMPERATURE: 98.1 F | SYSTOLIC BLOOD PRESSURE: 156 MMHG | OXYGEN SATURATION: 98 % | DIASTOLIC BLOOD PRESSURE: 95 MMHG | HEART RATE: 105 BPM

## 2025-01-15 DIAGNOSIS — M54.31 BILATERAL SCIATICA: ICD-10-CM

## 2025-01-15 DIAGNOSIS — M54.32 BILATERAL SCIATICA: ICD-10-CM

## 2025-01-15 DIAGNOSIS — M26.629 TMJ SYNDROME: Primary | ICD-10-CM

## 2025-01-15 DIAGNOSIS — K21.9 GASTROESOPHAGEAL REFLUX DISEASE WITHOUT ESOPHAGITIS: ICD-10-CM

## 2025-01-15 LAB
EKG ATRIAL RATE: 103 BPM
EKG DIAGNOSIS: NORMAL
EKG P AXIS: 50 DEGREES
EKG P-R INTERVAL: 169 MS
EKG Q-T INTERVAL: 339 MS
EKG QRS DURATION: 87 MS
EKG QTC CALCULATION (BAZETT): 444 MS
EKG R AXIS: 27 DEGREES
EKG T AXIS: 12 DEGREES
EKG VENTRICULAR RATE: 103 BPM

## 2025-01-15 PROCEDURE — 6370000000 HC RX 637 (ALT 250 FOR IP): Performed by: EMERGENCY MEDICINE

## 2025-01-15 PROCEDURE — 99283 EMERGENCY DEPT VISIT LOW MDM: CPT

## 2025-01-15 RX ORDER — ESOMEPRAZOLE MAGNESIUM 40 MG/1
CAPSULE, DELAYED RELEASE ORAL DAILY
COMMUNITY
Start: 2024-12-24

## 2025-01-15 RX ORDER — MAGNESIUM HYDROXIDE/ALUMINUM HYDROXICE/SIMETHICONE 120; 1200; 1200 MG/30ML; MG/30ML; MG/30ML
30 SUSPENSION ORAL
Status: COMPLETED | OUTPATIENT
Start: 2025-01-15 | End: 2025-01-15

## 2025-01-15 RX ORDER — SUCRALFATE 1 G/1
1 TABLET ORAL 3 TIMES DAILY
Qty: 90 TABLET | Refills: 0 | Status: SHIPPED | OUTPATIENT
Start: 2025-01-15

## 2025-01-15 RX ORDER — LIDOCAINE HYDROCHLORIDE 20 MG/ML
15 SOLUTION OROPHARYNGEAL
Status: COMPLETED | OUTPATIENT
Start: 2025-01-15 | End: 2025-01-15

## 2025-01-15 RX ORDER — LISINOPRIL 5 MG/1
5 TABLET ORAL DAILY
COMMUNITY
Start: 2024-12-18

## 2025-01-15 RX ORDER — SUCRALFATE 1 G/1
TABLET ORAL
COMMUNITY
Start: 2024-11-27 | End: 2025-01-15

## 2025-01-15 RX ADMIN — LIDOCAINE HYDROCHLORIDE 15 ML: 20 SOLUTION ORAL at 05:48

## 2025-01-15 RX ADMIN — ALUMINUM HYDROXIDE, MAGNESIUM HYDROXIDE, AND SIMETHICONE 30 ML: 200; 200; 20 SUSPENSION ORAL at 05:48

## 2025-01-15 ASSESSMENT — ENCOUNTER SYMPTOMS
BACK PAIN: 1
ABDOMINAL PAIN: 1
RESPIRATORY NEGATIVE: 1

## 2025-01-15 ASSESSMENT — PAIN - FUNCTIONAL ASSESSMENT: PAIN_FUNCTIONAL_ASSESSMENT: 0-10

## 2025-01-15 ASSESSMENT — PAIN SCALES - GENERAL: PAINLEVEL_OUTOF10: 6

## 2025-01-15 NOTE — ED NOTES
D/C home, ambulatory, with family and responsible . D/C instructions, medications, and follow up reviewed; understanding verbalized. A&Ox4.

## 2025-01-15 NOTE — DISCHARGE INSTRUCTIONS
Take Miralax over-the-counter for constipation.  Follow-up with oral surgeon regarding TMJ.  Follow-up with orthopedic surgeon regarding sciatica.  Follow-up with OB/GYN for fibroid tumor.

## 2025-01-15 NOTE — ED NOTES
[facility] EMERGENCY DEPARTMENT  EMERGENCY DEPARTMENT ENCOUNTER      Pt Name: Yuriy Oconnor  MRN: [id number]  Birthdate 1980  Date of evaluation: 1/15/2025  Provider: Malachi Moreno MD  5:41 AM    CHIEF COMPLAINT       Chief Complaint   Patient presents with    Abdominal Pain    Chills         HISTORY OF PRESENT ILLNESS    Yuriy Oconnor is a 44 y.o. female who presents to the emergency department with multiple chronic complaints.  She reports bilateral TMJ pain that has been going on for over a year.  She has been referred to dental specialist, but appointment is delayed.  Additionally, she complains of low back pain with, burning and cold sensations running down both legs which also has been going on for over a year.  She takes Naprosyn which improves pain.  She is waiting for insurance to approve MRI.  She has lower abdominal pain from fibroid tumor.  Naprosyn also improves that pain.  Again, she is awaiting appointment with OB/GYN.  She is requesting sucralfate for GERD.  She was on sucralfate which helped GERD symptoms 3 months ago.    HPI    Nursing Notes were reviewed.    REVIEW OF SYSTEMS       Review of Systems   Constitutional: Negative.    HENT:          Bilateral TMJ pain   Respiratory: Negative.     Cardiovascular:  Positive for chest pain.   Gastrointestinal:  Positive for abdominal pain.   Genitourinary: Negative.    Musculoskeletal:  Positive for back pain.   Neurological: Negative.    Hematological: Negative.        Except as noted above the remainder of the review of systems was reviewed and negative.       PAST MEDICAL HISTORY     Past Medical History:   Diagnosis Date    GERD (gastroesophageal reflux disease)     Hypertension          SURGICAL HISTORY     History reviewed. No pertinent surgical history.      CURRENT MEDICATIONS       Previous Medications    ALBUTEROL SULFATE HFA (PROVENTIL;VENTOLIN;PROAIR) 108 (90 BASE) MCG/ACT INHALER    Inhale 2 puffs into the lungs every 4 hours

## 2025-01-15 NOTE — ED TRIAGE NOTES
Pt states that she woke up feeling off. She woke up with the feeling of ice and heat on her neck, she has been having chills for a month, tightness in her jaw, numbness in her left shoulder and upper arm along with three of her fingers that has been going on for a month as well. Pt state that she has been having nausea and left abdominal pain, sometimes she has constipation and sometimes she has diarrhea. Pt takes omeprazole and iron.

## 2025-03-06 ENCOUNTER — HOSPITAL ENCOUNTER (EMERGENCY)
Facility: HOSPITAL | Age: 45
Discharge: HOME | End: 2025-03-06
Attending: EMERGENCY MEDICINE
Payer: MEDICAID

## 2025-03-06 ENCOUNTER — APPOINTMENT (OUTPATIENT)
Facility: HOSPITAL | Age: 45
End: 2025-03-06
Payer: MEDICAID

## 2025-03-06 VITALS
HEIGHT: 64 IN | BODY MASS INDEX: 33.97 KG/M2 | WEIGHT: 199 LBS | SYSTOLIC BLOOD PRESSURE: 164 MMHG | HEART RATE: 81 BPM | OXYGEN SATURATION: 98 % | RESPIRATION RATE: 19 BRPM | DIASTOLIC BLOOD PRESSURE: 95 MMHG | TEMPERATURE: 98 F

## 2025-03-06 DIAGNOSIS — K21.9 GASTROESOPHAGEAL REFLUX DISEASE WITHOUT ESOPHAGITIS: ICD-10-CM

## 2025-03-06 DIAGNOSIS — F41.1 ANXIETY STATE: Primary | ICD-10-CM

## 2025-03-06 DIAGNOSIS — K59.00 CONSTIPATION, UNSPECIFIED CONSTIPATION TYPE: ICD-10-CM

## 2025-03-06 LAB
APPEARANCE UR: CLEAR
BILIRUB UR QL: NEGATIVE
COLOR UR: NORMAL
GLUCOSE UR STRIP.AUTO-MCNC: NEGATIVE MG/DL
HCG UR QL: NEGATIVE
HGB UR QL STRIP: NEGATIVE
KETONES UR QL STRIP.AUTO: NEGATIVE MG/DL
LEUKOCYTE ESTERASE UR QL STRIP.AUTO: NEGATIVE
NITRITE UR QL STRIP.AUTO: NEGATIVE
PH UR STRIP: 6 (ref 5–8)
PROT UR STRIP-MCNC: NEGATIVE MG/DL
SP GR UR REFRACTOMETRY: <1.005 (ref 1–1.03)
UROBILINOGEN UR QL STRIP.AUTO: 0.2 EU/DL (ref 0.2–1)

## 2025-03-06 PROCEDURE — 99284 EMERGENCY DEPT VISIT MOD MDM: CPT

## 2025-03-06 PROCEDURE — 74018 RADEX ABDOMEN 1 VIEW: CPT

## 2025-03-06 PROCEDURE — 81003 URINALYSIS AUTO W/O SCOPE: CPT

## 2025-03-06 PROCEDURE — 81025 URINE PREGNANCY TEST: CPT

## 2025-03-06 RX ORDER — DOCUSATE SODIUM 100 MG/1
100 CAPSULE, LIQUID FILLED ORAL 2 TIMES DAILY
Qty: 30 CAPSULE | Refills: 0 | Status: SHIPPED | OUTPATIENT
Start: 2025-03-06 | End: 2025-04-05

## 2025-03-06 RX ORDER — POLYETHYLENE GLYCOL 3350 17 G/17G
17 POWDER ORAL DAILY
Qty: 225 G | Refills: 0 | Status: SHIPPED | OUTPATIENT
Start: 2025-03-06 | End: 2025-04-05

## 2025-03-06 ASSESSMENT — PAIN DESCRIPTION - LOCATION: LOCATION: ABDOMEN

## 2025-03-06 ASSESSMENT — PAIN SCALES - GENERAL: PAINLEVEL_OUTOF10: 7

## 2025-03-06 ASSESSMENT — LIFESTYLE VARIABLES
HOW OFTEN DO YOU HAVE A DRINK CONTAINING ALCOHOL: NEVER
HOW MANY STANDARD DRINKS CONTAINING ALCOHOL DO YOU HAVE ON A TYPICAL DAY: PATIENT DOES NOT DRINK

## 2025-03-06 ASSESSMENT — PAIN - FUNCTIONAL ASSESSMENT: PAIN_FUNCTIONAL_ASSESSMENT: 0-10

## 2025-03-06 NOTE — ED TRIAGE NOTES
Patient arrives from home with  for several complaints- chest pressure, abdominal pain, dysuria and nausea that began yesterday after breaking fast.

## 2025-03-06 NOTE — ED NOTES
Patient discharged using language line   Patient verbalized understanding of medication, follow-up and discharge instruction.  Patient a&Ox4

## 2025-03-06 NOTE — ED PROVIDER NOTES
dictation were completed with voice recognition software. Quite often unanticipated grammatical, syntax, homophones, and other interpretive errors are inadvertently transcribed by the computer software. Please disregards these errors. Please excuse any errors that have escaped final proofreading.)     Latasha iGlbert MD  03/06/25 0657

## 2025-05-20 ENCOUNTER — HOSPITAL ENCOUNTER (OUTPATIENT)
Facility: HOSPITAL | Age: 45
Discharge: HOME | End: 2025-05-23
Payer: MEDICAID

## 2025-05-20 DIAGNOSIS — M54.41 CHRONIC BILATERAL LOW BACK PAIN WITH RIGHT-SIDED SCIATICA: ICD-10-CM

## 2025-05-20 DIAGNOSIS — G89.29 CHRONIC BILATERAL LOW BACK PAIN WITH RIGHT-SIDED SCIATICA: ICD-10-CM

## 2025-05-20 PROCEDURE — 72148 MRI LUMBAR SPINE W/O DYE: CPT

## 2025-07-17 ENCOUNTER — TRANSCRIBE ORDERS (OUTPATIENT)
Facility: HOSPITAL | Age: 45
End: 2025-07-17

## 2025-07-17 DIAGNOSIS — I20.9 ANGINA PECTORIS, UNSPECIFIED: Primary | ICD-10-CM

## 2025-07-24 ENCOUNTER — HOSPITAL ENCOUNTER (OUTPATIENT)
Facility: HOSPITAL | Age: 45
Discharge: HOME | End: 2025-07-26
Payer: MEDICAID

## 2025-07-24 VITALS — WEIGHT: 197 LBS | BODY MASS INDEX: 33.63 KG/M2 | HEIGHT: 64 IN

## 2025-07-24 DIAGNOSIS — I20.9 ANGINA PECTORIS, UNSPECIFIED: ICD-10-CM

## 2025-07-24 LAB
ECHO BSA: 2.01 M2
STRESS BASELINE DIAS BP: 100 MMHG
STRESS BASELINE HR: 95 BPM
STRESS BASELINE SYS BP: 148 MMHG
STRESS ESTIMATED WORKLOAD: 7 METS
STRESS EXERCISE DUR MIN: 5 MIN
STRESS EXERCISE DUR SEC: 19 SEC
STRESS PEAK DIAS BP: 100 MMHG
STRESS PEAK SYS BP: 162 MMHG
STRESS PERCENT HR ACHIEVED: 89 %
STRESS POST PEAK HR: 156 BPM
STRESS RATE PRESSURE PRODUCT: NORMAL BPM*MMHG
STRESS STAGE 1 BP: NORMAL MMHG
STRESS STAGE 1 DURATION: 3 MIN:SEC
STRESS STAGE 1 HR: 140 BPM
STRESS STAGE 2 DURATION: NORMAL MIN:SEC
STRESS STAGE 2 HR: 155 BPM
STRESS STAGE RECOVERY 1 DURATION: 1 MIN:SEC
STRESS STAGE RECOVERY 1 HR: 122 BPM
STRESS STAGE RECOVERY 2 BP: NORMAL MMHG
STRESS STAGE RECOVERY 2 DURATION: NORMAL MIN:SEC
STRESS STAGE RECOVERY 2 HR: 109 BPM
STRESS TARGET HR: 175 BPM

## 2025-07-24 PROCEDURE — 93017 CV STRESS TEST TRACING ONLY: CPT

## 2025-07-24 NOTE — CONSULTS
Session ID: [id number]  Session Duration: Longer than 50 minutes  Language: Telugu   ID: #[id number]   Name: Omkar